# Patient Record
Sex: FEMALE | Race: WHITE | NOT HISPANIC OR LATINO | Employment: FULL TIME | URBAN - METROPOLITAN AREA
[De-identification: names, ages, dates, MRNs, and addresses within clinical notes are randomized per-mention and may not be internally consistent; named-entity substitution may affect disease eponyms.]

---

## 2017-03-10 LAB
ABO + RH BLD: NORMAL
ABO + RH BLD: NORMAL
BLD GP AB SCN SERPL QL: NORMAL
HBV SURFACE AG SERPL QL IA: NORMAL
HIV 1+2 AB+HIV1 P24 AG SERPL QL IA: NORMAL
RUBELLA ANTIBODY IGG QUANTITATIVE: NORMAL IU/ML
T PALLIDUM IGG SER QL: NORMAL

## 2017-09-14 LAB — GROUP B STREP PCR: NORMAL

## 2017-09-18 ENCOUNTER — HOSPITAL ENCOUNTER (INPATIENT)
Facility: CLINIC | Age: 32
LOS: 5 days | Discharge: HOME | End: 2017-09-23
Attending: OBSTETRICS & GYNECOLOGY | Admitting: OBSTETRICS & GYNECOLOGY
Payer: COMMERCIAL

## 2017-09-18 PROCEDURE — 12000029 ZZH R&B OB INTERMEDIATE

## 2017-09-18 PROCEDURE — 25000128 H RX IP 250 OP 636: Performed by: OBSTETRICS & GYNECOLOGY

## 2017-09-18 PROCEDURE — 25000132 ZZH RX MED GY IP 250 OP 250 PS 637: Performed by: OBSTETRICS & GYNECOLOGY

## 2017-09-18 PROCEDURE — 3E0P7GC INTRODUCTION OF OTHER THERAPEUTIC SUBSTANCE INTO FEMALE REPRODUCTIVE, VIA NATURAL OR ARTIFICIAL OPENING: ICD-10-PCS | Performed by: OBSTETRICS & GYNECOLOGY

## 2017-09-18 RX ORDER — HYDROXYZINE HYDROCHLORIDE 50 MG/1
50-100 TABLET, FILM COATED ORAL EVERY 6 HOURS PRN
Status: DISCONTINUED | OUTPATIENT
Start: 2017-09-18 | End: 2017-09-23 | Stop reason: HOSPADM

## 2017-09-18 RX ADMIN — SODIUM CHLORIDE, POTASSIUM CHLORIDE, SODIUM LACTATE AND CALCIUM CHLORIDE 1000 ML: 600; 310; 30; 20 INJECTION, SOLUTION INTRAVENOUS at 17:00

## 2017-09-18 RX ADMIN — HYDROXYZINE HYDROCHLORIDE 100 MG: 50 TABLET, FILM COATED ORAL at 21:02

## 2017-09-18 RX ADMIN — SODIUM CHLORIDE, POTASSIUM CHLORIDE, SODIUM LACTATE AND CALCIUM CHLORIDE: 600; 310; 30; 20 INJECTION, SOLUTION INTRAVENOUS at 21:30

## 2017-09-18 RX ADMIN — DINOPROSTONE 10 MG: 10 INSERT VAGINAL at 18:37

## 2017-09-18 NOTE — IP AVS SNAPSHOT
MRN:[id number]                      After Visit Summary   9/18/2017    Ms. Teri Mendez    MRN: [id number]           Thank you!     Thank you for choosing [facility] for your care. Our goal is always to provide you with excellent care. Hearing back from our patients is one way we can continue to improve our services. Please take a few minutes to complete the written survey that you may receive in the mail after you visit with us. Thank you!        Patient Information     Date Of Birth          1985        About your hospital stay     You were admitted on:  September 18, 2017 You last received care in the:  [address]    You were discharged on:  September 23, 2017        Reason for your hospital stay       Maternity care                  Who to Call     For medical emergencies, please call 911.  For non-urgent questions about your medical care, please call your primary care provider or clinic, [phone]  For questions related to your surgery, please call your surgery clinic        Attending Provider     Provider Specialty    Jazmin Hogue MD OB/Gyn       Primary Care Provider Office Phone # Fax #    Marilou Kimble [phone]      After Care Instructions     Activity       Review discharge instructions            Diet       Resume previous diet            Discharge Instructions - Gestational diabetic patients       Gestational diabetic patients to follow up for fasting blood sugar and 2 hour 75gm glucose load at 6 weeks postpartum.            Discharge Instructions - Hypertensive disorders patients       High Blood pressure patients to follow up in clinic or via home care within one week for a blood pressure check            Discharge Instructions - Postpartum visit       Schedule postpartum visit with your provider and return to clinic in 2 and 6 weeks.                  Further instructions from your care team       Postop   Birth Instructions    Activity       Do not lift more than 10 pounds for 6 weeks after surgery.  Ask family and friends for help when you need it.    No driving until you have stopped taking your pain medications (usually two weeks after surgery).    No heavy exercise or activity for 6 weeks.  Don't do anything that will put a strain on your surgery site.    Don't strain when using the toilet.  Your care team may prescribe a stool softener if you have problems with your bowel movements.     To care for your incision:       Keep the incision clean and dry.    Do not soak your incision in water. No swimming or hot tubs until it has fully healed. You may soak in the bathtub if the water level is below your incision.    Do not use peroxide, gel, cream, lotion, or ointment on your incision.    Adjust your clothes to avoid pressure on your surgery site (check the elastic in your underwear for example).     You may see a small amount of clear or pink drainage and this is normal.  Check with your health care provider:       If the drainage increases or has an odor.    If the incision reddens, you have swelling, or develop a rash.    If you have increased pain and the medicine we prescribed doesn't help.    If you have a fever above 100.4 F (38 C) with or without chills when placing thermometer under your tongue.   The area around your incision (surgery wound), will feel numb.  This is normal. The numbness should go away in less than a year.     Keep your hands clean:  Always wash your hands before touching your incision (surgery wound). This helps reduce your risk of infection. If your hands aren't dirty, you may use an alcohol hand-rub to clean your hands. Keep your nails clean and short.    Call your healthcare provider if you have any of these symptoms:       You soak a sanitary pad with blood within 1 hour, or you see blood clots larger than a golf ball.    Bleeding that lasts more than 6 weeks.    Vaginal  "discharge that smells bad.    Severe pain, cramping or tenderness in your lower belly area.    A need to urinate more frequently (use the toilet more often), more urgently (use the toilet very quickly), or it burns when you urinate.    Nausea and vomiting.    Redness, swelling or pain around a vein in your leg.    Problems breastfeeding or a red or painful area on your breast.    Chest pain and cough or are gasping for air.    Problems with coping with sadness, anxiety or depression. If you have concerns about hurting yourself or the baby, call your provider immediately.      You have questions or concerns after you return home.                  Pending Results     No orders found from 9/16/2017 to 9/19/2017.            Statement of Approval     Ordered          09/23/17 0926  I have reviewed and agree with all the recommendations and orders detailed in this document.  EFFECTIVE NOW     Approved and electronically signed by:  Paulo Nieto MD             Admission Information     Date & Time Provider Department Dept. Phone    9/18/2017 Jazmin Hogue MD [address] [phone]      Your Vitals Were     Blood Pressure Pulse Temperature Respirations Height Weight    120/92 92 97.9  F (36.6  C) (Oral) 16 1.676 m (5' 6\") 64.4 kg (142 lb)    Pulse Oximetry BMI (Body Mass Index)                98% 22.92 kg/m2          MyChart Information     [facility] lets you send messages to your doctor, view your test results, renew your prescriptions, schedule appointments and more. To sign up, go to [email or web address] . Click on \"Log in\" on the left side of the screen, which will take you to the Welcome page. Then click on \"Sign up Now\" on the right side of the page.     You will be asked to enter the access code listed below, as well as some personal information. Please follow the directions to create your username and password.     Your access code is: [email or web address]  Expires: " 2017  1:33 PM     Your access code will  in 90 days. If you need help or a new code, please call your [facility] clinic or [phone].        Care EveryWhere ID     This is your Care EveryWhere ID. This could be used by other organizations to access your [facility] medical records  [id number]        Equal Access to Services     BAUDILIOSAL ANDERSON : Hadii gwen ku hadasho Soomaali, waaxda luqadaha, qaybta kaalmada adecaroyada, alexy fariaslizbethjason harper . So [facility] [phone].    ATENCIÓN: Si habla español, tiene a west disposición servicios gratuitos de asistencia lingüística. Llame al [phone].    We comply with applicable federal civil rights laws and [place] laws. We do not discriminate on the basis of race, color, national origin, age, disability sex, sexual orientation or gender identity.               Review of your medicines      UNREVIEWED medicines. Ask your doctor about these medicines        Dose / Directions    acetaminophen 325 MG tablet   Commonly known as:  TYLENOL   Used for:  Missed         Dose:  650 mg   Take 2 tablets (650 mg) by mouth every 4 hours as needed for other (mild pain)   Quantity:  100 tablet   Refills:  0       breast pump Misc   Used for:  Postpartum care and examination of lactating mother   Ask about: Should I take this medication?        Dose:  1 each   1 each once for 1 dose   Quantity:  1 each   Refills:  0       doxycycline 100 MG tablet   Commonly known as:  VIBRA-TABS   Used for:  Missed         Dose:  100 mg   Take 1 tablet (100 mg) by mouth 2 times daily   Quantity:  10 tablet   Refills:  0       doxylamine 25 MG Tabs tablet   Commonly known as:  UNISOM        Dose:  25 mg   Take 25 mg by mouth nightly as needed   Refills:  0       ibuprofen 600 MG tablet   Commonly known as:  ADVIL/MOTRIN   Used for:  Missed         Dose:  600 mg   Take 1 tablet (600 mg) by mouth every 6 hours as needed for pain (mild)   Quantity:  30 tablet    Refills:  0       PNV FE FUM/DOCUSATE/FOLIC ACID 29-1 MG Tabs        Refills:  0         START taking        Dose / Directions    oxyCODONE 5 MG IR tablet   Commonly known as:  ROXICODONE   Used for:  Postpartum care and examination of lactating mother        Dose:  5-10 mg   Take 1-2 tablets (5-10 mg) by mouth every 3 hours as needed for moderate to severe pain   Quantity:  30 tablet   Refills:  0         STOP taking     acetaminophen-codeine 300-30 MG per tablet   Commonly known as:  TYLENOL w/CODEINE No. 3                Where to get your medicines      Some of these will need a paper prescription and others can be bought over the counter. Ask your nurse if you have questions.     Bring a paper prescription for each of these medications     breast pump Misc    oxyCODONE 5 MG IR tablet                Protect others around you: Learn how to safely use, store and throw away your medicines at www.disposemymeds.org.             Medication List: This is a list of all your medications and when to take them. Check marks below indicate your daily home schedule. Keep this list as a reference.      Medications           Morning Afternoon Evening Bedtime As Needed    acetaminophen 325 MG tablet   Commonly known as:  TYLENOL   Take 2 tablets (650 mg) by mouth every 4 hours as needed for other (mild pain)   Last time this was given:  975 mg on 9/23/2017 10:50 AM                                doxycycline 100 MG tablet   Commonly known as:  VIBRA-TABS   Take 1 tablet (100 mg) by mouth 2 times daily                                doxylamine 25 MG Tabs tablet   Commonly known as:  UNISOM   Take 25 mg by mouth nightly as needed                                ibuprofen 600 MG tablet   Commonly known as:  ADVIL/MOTRIN   Take 1 tablet (600 mg) by mouth every 6 hours as needed for pain (mild)   Last time this was given:  800 mg on 9/23/2017 12:46 PM                                oxyCODONE 5 MG IR tablet   Commonly known as:   ROXICODONE   Take 1-2 tablets (5-10 mg) by mouth every 3 hours as needed for moderate to severe pain   Last time this was given:  10 mg on 9/23/2017 12:46 PM                                PNV FE FUM/DOCUSATE/FOLIC ACID 29-1 MG Tabs                                  ASK your doctor about these medications           Morning Afternoon Evening Bedtime As Needed    breast pump Misc   1 each once for 1 dose   Ask about: Should I take this medication?

## 2017-09-18 NOTE — H&P
"Labor and delivery admit note.  HPI  Teri Mendez  is a 31 year old   at 36w3d admitted for induction of labor secondary to diagnosis made today of cholestasis of pregnancy with bile acids of 103 with elevated AST/ALT.  She presented to the office on Friday with itching at night that was mild but bothersome.  Labs were obtained and results came in today.  MFM was consulted, and they agree that proceeding with delivery.      Estimated Date of Delivery: Oct 13, 2017  36w3d  Prenatal care - early and adequate with Dr. Hogue, dated by LMP c/w first trimester USG    Prenatal course - complicated by h/o cervical cancer s/p LEEP procedure, h/o miscarriage, cholestasis in pregnancy     Prenatal labs  Blood type O, Rh positive  HIV-negative  Hepatitis BsAg- negative  Trep AB- Negative  Rubella- Immune  Pap- normal  GC/Chlamydia- negative  Quad screen- normal  Glucola- normal  GBS- negative.    Past Medical History:   Diagnosis Date     Cervical cancer (H)      PONV (postoperative nausea and vomiting)      Past Surgical History:   Procedure Laterality Date     DILATION AND CURETTAGE SUCTION N/A 2016    Procedure: DILATION AND CURETTAGE SUCTION;  Surgeon: Marilou Kimble MD;  Location: [facility]     ORTHOPEDIC SURGERY       Social History   Substance Use Topics     Smoking status: Never Smoker     Smokeless tobacco: Not on file     Alcohol use Yes      Comment: 5/week       Objective  Alert and oriented  /68  Temp 97.9  F (36.6  C) (Temporal)  Resp 16  Ht 1.676 m (5' 6\")  Wt 64.4 kg (142 lb)  BMI 22.92 kg/m2  Heart and Lungs- normal  PA- uterus full term. Contractions + q 2-3 mins  FHR- 130 baseline, good variability, accels +. Mild variables seen.  Pelvic ( by admitting RN )  Cx: closed/70/-2    Assessment   at 36w3d - cholestasis in pregnancy with elevated liver function studies.     Admit to L&D  Routine intrapartum management.  Cervical ripening and induction with oxytocin/AROM " when able  Epidural prn  Anticipate   GBS negative    Jazmin Hogue MD

## 2017-09-18 NOTE — IP AVS SNAPSHOT
[address]    [place] [address]    Phone:  [phone]                                       After Visit Summary   9/18/2017    Ms. Teri Mendez    MRN: [id number]           After Visit Summary Signature Page     I have received my discharge instructions, and my questions have been answered. I have discussed any challenges I see with this plan with the nurse or doctor.    ..........................................................................................................................................  Patient/Patient Representative Signature      ..........................................................................................................................................  Patient Representative Print Name and Relationship to Patient    ..................................................               ................................................  Date                                            Time    ..........................................................................................................................................  Reviewed by Signature/Title    ...................................................              ..............................................  Date                                                            Time

## 2017-09-19 PROCEDURE — 25000128 H RX IP 250 OP 636: Performed by: OBSTETRICS & GYNECOLOGY

## 2017-09-19 PROCEDURE — 25000125 ZZHC RX 250: Performed by: OBSTETRICS & GYNECOLOGY

## 2017-09-19 PROCEDURE — S0191 MISOPROSTOL, ORAL, 200 MCG: HCPCS | Performed by: OBSTETRICS & GYNECOLOGY

## 2017-09-19 PROCEDURE — 25000132 ZZH RX MED GY IP 250 OP 250 PS 637: Performed by: OBSTETRICS & GYNECOLOGY

## 2017-09-19 PROCEDURE — 12000029 ZZH R&B OB INTERMEDIATE

## 2017-09-19 RX ORDER — BETAMETHASONE SODIUM PHOSPHATE AND BETAMETHASONE ACETATE 3; 3 MG/ML; MG/ML
12 INJECTION, SUSPENSION INTRA-ARTICULAR; INTRALESIONAL; INTRAMUSCULAR; SOFT TISSUE DAILY
Status: DISCONTINUED | OUTPATIENT
Start: 2017-09-19 | End: 2017-09-20

## 2017-09-19 RX ORDER — SODIUM CHLORIDE, SODIUM LACTATE, POTASSIUM CHLORIDE, CALCIUM CHLORIDE 600; 310; 30; 20 MG/100ML; MG/100ML; MG/100ML; MG/100ML
INJECTION, SOLUTION INTRAVENOUS CONTINUOUS
Status: DISCONTINUED | OUTPATIENT
Start: 2017-09-18 | End: 2017-09-20

## 2017-09-19 RX ORDER — LIDOCAINE 40 MG/G
CREAM TOPICAL
Status: DISCONTINUED | OUTPATIENT
Start: 2017-09-19 | End: 2017-09-20

## 2017-09-19 RX ORDER — PRENATAL VIT/IRON FUM/FOLIC AC 27MG-0.8MG
1 TABLET ORAL DAILY
Status: DISCONTINUED | OUTPATIENT
Start: 2017-09-19 | End: 2017-09-20

## 2017-09-19 RX ORDER — MISOPROSTOL 100 UG/1
25 TABLET ORAL EVERY 4 HOURS PRN
Status: DISCONTINUED | OUTPATIENT
Start: 2017-09-19 | End: 2017-09-20

## 2017-09-19 RX ORDER — OXYTOCIN/0.9 % SODIUM CHLORIDE 30/500 ML
1-24 PLASTIC BAG, INJECTION (ML) INTRAVENOUS CONTINUOUS
Status: DISCONTINUED | OUTPATIENT
Start: 2017-09-19 | End: 2017-09-20

## 2017-09-19 RX ADMIN — BETAMETHASONE SODIUM PHOSPHATE AND BETAMETHASONE ACETATE 12 MG: 3; 3 INJECTION, SUSPENSION INTRA-ARTICULAR; INTRALESIONAL; INTRAMUSCULAR at 20:53

## 2017-09-19 RX ADMIN — HYDROXYZINE HYDROCHLORIDE 100 MG: 50 TABLET, FILM COATED ORAL at 03:34

## 2017-09-19 RX ADMIN — Medication 25 MCG: at 09:12

## 2017-09-19 RX ADMIN — SODIUM CHLORIDE, POTASSIUM CHLORIDE, SODIUM LACTATE AND CALCIUM CHLORIDE: 600; 310; 30; 20 INJECTION, SOLUTION INTRAVENOUS at 16:04

## 2017-09-19 RX ADMIN — SODIUM CHLORIDE, POTASSIUM CHLORIDE, SODIUM LACTATE AND CALCIUM CHLORIDE: 600; 310; 30; 20 INJECTION, SOLUTION INTRAVENOUS at 05:30

## 2017-09-19 RX ADMIN — HYDROXYZINE HYDROCHLORIDE 100 MG: 50 TABLET, FILM COATED ORAL at 21:08

## 2017-09-19 RX ADMIN — SODIUM CHLORIDE, POTASSIUM CHLORIDE, SODIUM LACTATE AND CALCIUM CHLORIDE: 600; 310; 30; 20 INJECTION, SOLUTION INTRAVENOUS at 13:29

## 2017-09-19 RX ADMIN — OXYTOCIN-SODIUM CHLORIDE 0.9% IV SOLN 30 UNIT/500ML 1 MILLI-UNITS/MIN: 30-0.9/5 SOLUTION at 16:09

## 2017-09-19 NOTE — PLAN OF CARE
36+3 week primip admitted direct from clinic for cervical ripening for cholestasis. EUM/US applied with pts consent. Will await orders from Dr Hogue for induction.

## 2017-09-19 NOTE — PLAN OF CARE
Assumed care of patient at 0730. Pt comfortable, c/o mild cramping, with uterine contractions. Cervidil was removed at 0630. Pt has breakfast and plans to shower. PIV saline locked for shower. SVE at 0830, closed, posterior. Inner os closed, outer os fingertip. Dr. Hogue notified. Orders received for vaginal cytotec. With pt verbal consent, Cytotec administered at 0915. Pt tolerated well.  Pt and spouse verbalize understanding of plan of care. No questions or concerns at this time. Dr Hogue in department and updated at 1030. Cont to monitor and assess.  FHTs moderate variability, +accels, occasional variables noted.

## 2017-09-19 NOTE — PLAN OF CARE
SVE at 1315 remains unchanged, closed/70/-3, posterior. Uterine contractions tachysystole. Dr. Hogue notified of uterine activity and unable to administer next dose of cytotec or initiate pitocin. Fluid flush administered and uterine activity reassessed at 1415. Tachysytole continues. 1L fluid flush administered and repositioned patient onto left side. Dr. Hogue updated. At 1610, pitocin started with pt verbal consent. Pt and spouse verbalize understanding of plan of care. FHTs moderate variability, +accels, rare decels noted. Cont to monitor and assess.

## 2017-09-19 NOTE — PROGRESS NOTES
Teri Mendez  9/19/2017  [facility]    Patient is getting cytotec this morning, as cervidil overnight did not cause much cervical change - still closed/thick/high/posterior.  Discussed induction can take a long while, but given her diagnosis of cholestasis - important to move forwards with delivery.  Patient and  agreeable to plan.  Reactive NST.    Jazmin Hogue MD

## 2017-09-19 NOTE — PROGRESS NOTES
Teri Mendez  9/19/2017  [facility]    Patient currently has cervidil in place.  She received 100 mg Vistaril at 9 pm to help her sleep, given extensive itching due to cholestasis.  She will receive another dose at 3 am.  Reactive/reassuring NST.  Irregular contractions on tocometer.  Will consider cytotec vs. pitocin once cervidil is due to come out at around 7 am.      Jazmin Hogue MD

## 2017-09-20 ENCOUNTER — ANESTHESIA EVENT (OUTPATIENT)
Dept: OBGYN | Facility: CLINIC | Age: 32
End: 2017-09-20
Payer: COMMERCIAL

## 2017-09-20 ENCOUNTER — ANESTHESIA (OUTPATIENT)
Dept: OBGYN | Facility: CLINIC | Age: 32
End: 2017-09-20
Payer: COMMERCIAL

## 2017-09-20 PROBLEM — Z98.891 S/P PRIMARY LOW TRANSVERSE C-SECTION: Status: ACTIVE | Noted: 2017-09-20

## 2017-09-20 LAB
ABO + RH BLD: NORMAL
ABO + RH BLD: NORMAL
ALBUMIN SERPL-MCNC: 2 G/DL (ref 3.4–5)
ALP SERPL-CCNC: 261 U/L (ref 40–150)
ALT SERPL W P-5'-P-CCNC: 163 U/L (ref 0–50)
APTT PPP: 25 SEC (ref 22–37)
AST SERPL W P-5'-P-CCNC: 91 U/L (ref 0–45)
BILIRUB DIRECT SERPL-MCNC: 0.1 MG/DL (ref 0–0.2)
BILIRUB SERPL-MCNC: 0.3 MG/DL (ref 0.2–1.3)
BLD GP AB SCN SERPL QL: NORMAL
BLOOD BANK CMNT PATIENT-IMP: NORMAL
ERYTHROCYTE [DISTWIDTH] IN BLOOD BY AUTOMATED COUNT: 12.2 % (ref 10–15)
HCT VFR BLD AUTO: 33.4 % (ref 35–47)
HGB BLD-MCNC: 11.4 G/DL (ref 11.7–15.7)
INR PPP: 0.87 (ref 0.86–1.14)
MCH RBC QN AUTO: 30.8 PG (ref 26.5–33)
MCHC RBC AUTO-ENTMCNC: 34.1 G/DL (ref 31.5–36.5)
MCV RBC AUTO: 90 FL (ref 78–100)
PLATELET # BLD AUTO: 222 10E9/L (ref 150–450)
PROT SERPL-MCNC: 6.1 G/DL (ref 6.8–8.8)
RBC # BLD AUTO: 3.7 10E12/L (ref 3.8–5.2)
SPECIMEN EXP DATE BLD: NORMAL
T PALLIDUM IGG+IGM SER QL: NEGATIVE
WBC # BLD AUTO: 9.2 10E9/L (ref 4–11)

## 2017-09-20 PROCEDURE — 25000128 H RX IP 250 OP 636: Performed by: NURSE ANESTHETIST, CERTIFIED REGISTERED

## 2017-09-20 PROCEDURE — 85730 THROMBOPLASTIN TIME PARTIAL: CPT | Performed by: ANESTHESIOLOGY

## 2017-09-20 PROCEDURE — 25000128 H RX IP 250 OP 636: Performed by: OBSTETRICS & GYNECOLOGY

## 2017-09-20 PROCEDURE — 71000012 ZZH RECOVERY PHASE 1 LEVEL 1 FIRST HR: Performed by: OBSTETRICS & GYNECOLOGY

## 2017-09-20 PROCEDURE — 88307 TISSUE EXAM BY PATHOLOGIST: CPT | Mod: 26 | Performed by: OBSTETRICS & GYNECOLOGY

## 2017-09-20 PROCEDURE — 80076 HEPATIC FUNCTION PANEL: CPT | Performed by: ANESTHESIOLOGY

## 2017-09-20 PROCEDURE — 86780 TREPONEMA PALLIDUM: CPT | Performed by: OBSTETRICS & GYNECOLOGY

## 2017-09-20 PROCEDURE — 27210794 ZZH OR GENERAL SUPPLY STERILE: Performed by: OBSTETRICS & GYNECOLOGY

## 2017-09-20 PROCEDURE — 25000125 ZZHC RX 250: Performed by: OBSTETRICS & GYNECOLOGY

## 2017-09-20 PROCEDURE — 25000128 H RX IP 250 OP 636: Performed by: ANESTHESIOLOGY

## 2017-09-20 PROCEDURE — 25000132 ZZH RX MED GY IP 250 OP 250 PS 637: Performed by: OBSTETRICS & GYNECOLOGY

## 2017-09-20 PROCEDURE — 36415 COLL VENOUS BLD VENIPUNCTURE: CPT | Performed by: OBSTETRICS & GYNECOLOGY

## 2017-09-20 PROCEDURE — 37000008 ZZH ANESTHESIA TECHNICAL FEE, 1ST 30 MIN: Performed by: OBSTETRICS & GYNECOLOGY

## 2017-09-20 PROCEDURE — 37000009 ZZH ANESTHESIA TECHNICAL FEE, EACH ADDTL 15 MIN: Performed by: OBSTETRICS & GYNECOLOGY

## 2017-09-20 PROCEDURE — 25000125 ZZHC RX 250: Performed by: ANESTHESIOLOGY

## 2017-09-20 PROCEDURE — S0191 MISOPROSTOL, ORAL, 200 MCG: HCPCS | Performed by: OBSTETRICS & GYNECOLOGY

## 2017-09-20 PROCEDURE — 12000037 ZZH R&B POSTPARTUM INTERMEDIATE

## 2017-09-20 PROCEDURE — 86901 BLOOD TYPING SEROLOGIC RH(D): CPT | Performed by: OBSTETRICS & GYNECOLOGY

## 2017-09-20 PROCEDURE — 36000058 ZZH SURGERY LEVEL 3 EA 15 ADDTL MIN: Performed by: OBSTETRICS & GYNECOLOGY

## 2017-09-20 PROCEDURE — 36000056 ZZH SURGERY LEVEL 3 1ST 30 MIN: Performed by: OBSTETRICS & GYNECOLOGY

## 2017-09-20 PROCEDURE — 85610 PROTHROMBIN TIME: CPT | Performed by: ANESTHESIOLOGY

## 2017-09-20 PROCEDURE — 85027 COMPLETE CBC AUTOMATED: CPT | Performed by: OBSTETRICS & GYNECOLOGY

## 2017-09-20 PROCEDURE — 86900 BLOOD TYPING SEROLOGIC ABO: CPT | Performed by: OBSTETRICS & GYNECOLOGY

## 2017-09-20 PROCEDURE — 86850 RBC ANTIBODY SCREEN: CPT | Performed by: OBSTETRICS & GYNECOLOGY

## 2017-09-20 PROCEDURE — 25000125 ZZHC RX 250: Performed by: NURSE ANESTHETIST, CERTIFIED REGISTERED

## 2017-09-20 PROCEDURE — 71000013 ZZH RECOVERY PHASE 1 LEVEL 1 EA ADDTL HR: Performed by: OBSTETRICS & GYNECOLOGY

## 2017-09-20 PROCEDURE — 88307 TISSUE EXAM BY PATHOLOGIST: CPT | Performed by: OBSTETRICS & GYNECOLOGY

## 2017-09-20 PROCEDURE — 36415 COLL VENOUS BLD VENIPUNCTURE: CPT | Performed by: ANESTHESIOLOGY

## 2017-09-20 RX ORDER — FENTANYL CITRATE 50 UG/ML
INJECTION, SOLUTION INTRAMUSCULAR; INTRAVENOUS
Status: COMPLETED
Start: 2017-09-20 | End: 2017-09-20

## 2017-09-20 RX ORDER — MISOPROSTOL 200 UG/1
800 TABLET ORAL
Status: DISCONTINUED | OUTPATIENT
Start: 2017-09-20 | End: 2017-09-23 | Stop reason: HOSPADM

## 2017-09-20 RX ORDER — SIMETHICONE 80 MG
80 TABLET,CHEWABLE ORAL 4 TIMES DAILY PRN
Status: DISCONTINUED | OUTPATIENT
Start: 2017-09-20 | End: 2017-09-23 | Stop reason: HOSPADM

## 2017-09-20 RX ORDER — BUPIVACAINE HYDROCHLORIDE 7.5 MG/ML
INJECTION, SOLUTION INTRASPINAL PRN
Status: DISCONTINUED | OUTPATIENT
Start: 2017-09-20 | End: 2017-09-20

## 2017-09-20 RX ORDER — ONDANSETRON 2 MG/ML
INJECTION INTRAMUSCULAR; INTRAVENOUS
Status: DISCONTINUED
Start: 2017-09-20 | End: 2017-09-20 | Stop reason: HOSPADM

## 2017-09-20 RX ORDER — OXYTOCIN/0.9 % SODIUM CHLORIDE 30/500 ML
340 PLASTIC BAG, INJECTION (ML) INTRAVENOUS CONTINUOUS PRN
Status: DISCONTINUED | OUTPATIENT
Start: 2017-09-20 | End: 2017-09-23 | Stop reason: HOSPADM

## 2017-09-20 RX ORDER — NALOXONE HYDROCHLORIDE 0.4 MG/ML
.1-.4 INJECTION, SOLUTION INTRAMUSCULAR; INTRAVENOUS; SUBCUTANEOUS
Status: DISCONTINUED | OUTPATIENT
Start: 2017-09-20 | End: 2017-09-23 | Stop reason: HOSPADM

## 2017-09-20 RX ORDER — HYDROMORPHONE HYDROCHLORIDE 1 MG/ML
.3-.5 INJECTION, SOLUTION INTRAMUSCULAR; INTRAVENOUS; SUBCUTANEOUS EVERY 30 MIN PRN
Status: DISCONTINUED | OUTPATIENT
Start: 2017-09-20 | End: 2017-09-23 | Stop reason: HOSPADM

## 2017-09-20 RX ORDER — LANOLIN 100 %
OINTMENT (GRAM) TOPICAL
Status: DISCONTINUED | OUTPATIENT
Start: 2017-09-20 | End: 2017-09-23 | Stop reason: HOSPADM

## 2017-09-20 RX ORDER — HYDROCORTISONE 2.5 %
CREAM (GRAM) TOPICAL 3 TIMES DAILY PRN
Status: DISCONTINUED | OUTPATIENT
Start: 2017-09-20 | End: 2017-09-23 | Stop reason: HOSPADM

## 2017-09-20 RX ORDER — EPHEDRINE SULFATE 50 MG/ML
5 INJECTION, SOLUTION INTRAMUSCULAR; INTRAVENOUS; SUBCUTANEOUS
Status: DISCONTINUED | OUTPATIENT
Start: 2017-09-20 | End: 2017-09-23 | Stop reason: HOSPADM

## 2017-09-20 RX ORDER — ACETAMINOPHEN 325 MG/1
650 TABLET ORAL EVERY 4 HOURS PRN
Status: DISCONTINUED | OUTPATIENT
Start: 2017-09-23 | End: 2017-09-23 | Stop reason: HOSPADM

## 2017-09-20 RX ORDER — ACETAMINOPHEN 325 MG/1
975 TABLET ORAL EVERY 8 HOURS
Status: COMPLETED | OUTPATIENT
Start: 2017-09-20 | End: 2017-09-23

## 2017-09-20 RX ORDER — CEFAZOLIN SODIUM 1 G/3ML
1 INJECTION, POWDER, FOR SOLUTION INTRAMUSCULAR; INTRAVENOUS SEE ADMIN INSTRUCTIONS
Status: DISCONTINUED | OUTPATIENT
Start: 2017-09-20 | End: 2017-09-20

## 2017-09-20 RX ORDER — LIDOCAINE 40 MG/G
CREAM TOPICAL
Status: DISCONTINUED | OUTPATIENT
Start: 2017-09-20 | End: 2017-09-23 | Stop reason: HOSPADM

## 2017-09-20 RX ORDER — SODIUM CHLORIDE, SODIUM LACTATE, POTASSIUM CHLORIDE, CALCIUM CHLORIDE 600; 310; 30; 20 MG/100ML; MG/100ML; MG/100ML; MG/100ML
INJECTION, SOLUTION INTRAVENOUS CONTINUOUS
Status: DISCONTINUED | OUTPATIENT
Start: 2017-09-20 | End: 2017-09-20

## 2017-09-20 RX ORDER — DIPHENHYDRAMINE HCL 25 MG
25 CAPSULE ORAL EVERY 6 HOURS PRN
Status: DISCONTINUED | OUTPATIENT
Start: 2017-09-20 | End: 2017-09-23 | Stop reason: HOSPADM

## 2017-09-20 RX ORDER — OXYTOCIN/0.9 % SODIUM CHLORIDE 30/500 ML
100 PLASTIC BAG, INJECTION (ML) INTRAVENOUS CONTINUOUS
Status: DISCONTINUED | OUTPATIENT
Start: 2017-09-20 | End: 2017-09-23 | Stop reason: HOSPADM

## 2017-09-20 RX ORDER — FENTANYL CITRATE 50 UG/ML
INJECTION, SOLUTION INTRAMUSCULAR; INTRAVENOUS PRN
Status: DISCONTINUED | OUTPATIENT
Start: 2017-09-20 | End: 2017-09-20

## 2017-09-20 RX ORDER — OXYTOCIN/0.9 % SODIUM CHLORIDE 30/500 ML
PLASTIC BAG, INJECTION (ML) INTRAVENOUS CONTINUOUS PRN
Status: DISCONTINUED | OUTPATIENT
Start: 2017-09-20 | End: 2017-09-20

## 2017-09-20 RX ORDER — ACETAMINOPHEN 325 MG/1
TABLET ORAL
Status: DISCONTINUED
Start: 2017-09-20 | End: 2017-09-20 | Stop reason: HOSPADM

## 2017-09-20 RX ORDER — BISACODYL 10 MG
10 SUPPOSITORY, RECTAL RECTAL DAILY PRN
Status: DISCONTINUED | OUTPATIENT
Start: 2017-09-22 | End: 2017-09-23 | Stop reason: HOSPADM

## 2017-09-20 RX ORDER — OXYTOCIN 10 [USP'U]/ML
10 INJECTION, SOLUTION INTRAMUSCULAR; INTRAVENOUS
Status: DISCONTINUED | OUTPATIENT
Start: 2017-09-20 | End: 2017-09-23 | Stop reason: HOSPADM

## 2017-09-20 RX ORDER — KETOROLAC TROMETHAMINE 30 MG/ML
INJECTION, SOLUTION INTRAMUSCULAR; INTRAVENOUS
Status: DISCONTINUED
Start: 2017-09-20 | End: 2017-09-20 | Stop reason: HOSPADM

## 2017-09-20 RX ORDER — NALBUPHINE HYDROCHLORIDE 10 MG/ML
2.5-5 INJECTION, SOLUTION INTRAMUSCULAR; INTRAVENOUS; SUBCUTANEOUS EVERY 6 HOURS PRN
Status: DISCONTINUED | OUTPATIENT
Start: 2017-09-20 | End: 2017-09-23 | Stop reason: HOSPADM

## 2017-09-20 RX ORDER — DEXTROSE, SODIUM CHLORIDE, SODIUM LACTATE, POTASSIUM CHLORIDE, AND CALCIUM CHLORIDE 5; .6; .31; .03; .02 G/100ML; G/100ML; G/100ML; G/100ML; G/100ML
INJECTION, SOLUTION INTRAVENOUS CONTINUOUS
Status: DISCONTINUED | OUTPATIENT
Start: 2017-09-20 | End: 2017-09-23 | Stop reason: HOSPADM

## 2017-09-20 RX ORDER — CITRIC ACID/SODIUM CITRATE 334-500MG
30 SOLUTION, ORAL ORAL
Status: COMPLETED | OUTPATIENT
Start: 2017-09-20 | End: 2017-09-20

## 2017-09-20 RX ORDER — PROPOFOL 10 MG/ML
INJECTION, EMULSION INTRAVENOUS PRN
Status: DISCONTINUED | OUTPATIENT
Start: 2017-09-20 | End: 2017-09-20

## 2017-09-20 RX ORDER — EPHEDRINE SULFATE 50 MG/ML
INJECTION, SOLUTION INTRAMUSCULAR; INTRAVENOUS; SUBCUTANEOUS PRN
Status: DISCONTINUED | OUTPATIENT
Start: 2017-09-20 | End: 2017-09-20

## 2017-09-20 RX ORDER — IBUPROFEN 400 MG/1
400-800 TABLET, FILM COATED ORAL EVERY 6 HOURS PRN
Status: DISCONTINUED | OUTPATIENT
Start: 2017-09-21 | End: 2017-09-21

## 2017-09-20 RX ORDER — KETOROLAC TROMETHAMINE 30 MG/ML
30 INJECTION, SOLUTION INTRAMUSCULAR; INTRAVENOUS EVERY 6 HOURS
Status: DISPENSED | OUTPATIENT
Start: 2017-09-20 | End: 2017-09-21

## 2017-09-20 RX ORDER — OXYCODONE HYDROCHLORIDE 5 MG/1
5-10 TABLET ORAL
Status: DISCONTINUED | OUTPATIENT
Start: 2017-09-20 | End: 2017-09-23 | Stop reason: HOSPADM

## 2017-09-20 RX ORDER — METHYLERGONOVINE MALEATE 0.2 MG/ML
200 INJECTION INTRAVENOUS
Status: DISCONTINUED | OUTPATIENT
Start: 2017-09-20 | End: 2017-09-23 | Stop reason: HOSPADM

## 2017-09-20 RX ORDER — CARBOPROST TROMETHAMINE 250 UG/ML
250 INJECTION, SOLUTION INTRAMUSCULAR
Status: DISCONTINUED | OUTPATIENT
Start: 2017-09-20 | End: 2017-09-23 | Stop reason: HOSPADM

## 2017-09-20 RX ORDER — MORPHINE SULFATE 1 MG/ML
INJECTION, SOLUTION EPIDURAL; INTRATHECAL; INTRAVENOUS PRN
Status: DISCONTINUED | OUTPATIENT
Start: 2017-09-20 | End: 2017-09-20

## 2017-09-20 RX ORDER — DIPHENHYDRAMINE HYDROCHLORIDE 50 MG/ML
25 INJECTION INTRAMUSCULAR; INTRAVENOUS EVERY 6 HOURS PRN
Status: DISCONTINUED | OUTPATIENT
Start: 2017-09-20 | End: 2017-09-23 | Stop reason: HOSPADM

## 2017-09-20 RX ORDER — ONDANSETRON 2 MG/ML
INJECTION INTRAMUSCULAR; INTRAVENOUS PRN
Status: DISCONTINUED | OUTPATIENT
Start: 2017-09-20 | End: 2017-09-20

## 2017-09-20 RX ORDER — MORPHINE SULFATE 1 MG/ML
INJECTION, SOLUTION EPIDURAL; INTRATHECAL; INTRAVENOUS
Status: COMPLETED
Start: 2017-09-20 | End: 2017-09-20

## 2017-09-20 RX ORDER — CEFAZOLIN SODIUM 2 G/100ML
2 INJECTION, SOLUTION INTRAVENOUS
Status: COMPLETED | OUTPATIENT
Start: 2017-09-20 | End: 2017-09-20

## 2017-09-20 RX ORDER — ONDANSETRON 2 MG/ML
4 INJECTION INTRAMUSCULAR; INTRAVENOUS EVERY 6 HOURS PRN
Status: DISCONTINUED | OUTPATIENT
Start: 2017-09-20 | End: 2017-09-23 | Stop reason: HOSPADM

## 2017-09-20 RX ORDER — AMOXICILLIN 250 MG
1-2 CAPSULE ORAL 2 TIMES DAILY
Status: DISCONTINUED | OUTPATIENT
Start: 2017-09-20 | End: 2017-09-23 | Stop reason: HOSPADM

## 2017-09-20 RX ADMIN — FENTANYL CITRATE 50 MCG: 50 INJECTION, SOLUTION INTRAMUSCULAR; INTRAVENOUS at 18:43

## 2017-09-20 RX ADMIN — ACETAMINOPHEN 975 MG: 325 TABLET, FILM COATED ORAL at 19:18

## 2017-09-20 RX ADMIN — Medication 5 MG: at 18:19

## 2017-09-20 RX ADMIN — PHENYLEPHRINE HYDROCHLORIDE 100 MCG: 10 INJECTION, SOLUTION INTRAMUSCULAR; INTRAVENOUS; SUBCUTANEOUS at 18:19

## 2017-09-20 RX ADMIN — PRENATAL VIT W/ FE FUMARATE-FA TAB 27-0.8 MG 1 TABLET: 27-0.8 TAB at 08:56

## 2017-09-20 RX ADMIN — FENTANYL CITRATE 50 MCG: 50 INJECTION, SOLUTION INTRAMUSCULAR; INTRAVENOUS at 18:18

## 2017-09-20 RX ADMIN — NALBUPHINE HYDROCHLORIDE 2.5 MG: 10 INJECTION, SOLUTION INTRAMUSCULAR; INTRAVENOUS; SUBCUTANEOUS at 21:40

## 2017-09-20 RX ADMIN — SODIUM CITRATE AND CITRIC ACID MONOHYDRATE 30 ML: 500; 334 SOLUTION ORAL at 16:42

## 2017-09-20 RX ADMIN — HYDROXYZINE HYDROCHLORIDE 100 MG: 50 TABLET, FILM COATED ORAL at 13:41

## 2017-09-20 RX ADMIN — PHENYLEPHRINE HYDROCHLORIDE 100 MCG: 10 INJECTION, SOLUTION INTRAMUSCULAR; INTRAVENOUS; SUBCUTANEOUS at 18:38

## 2017-09-20 RX ADMIN — CEFAZOLIN SODIUM 2 G: 2 INJECTION, SOLUTION INTRAVENOUS at 17:54

## 2017-09-20 RX ADMIN — ONDANSETRON 4 MG: 2 INJECTION INTRAMUSCULAR; INTRAVENOUS at 17:47

## 2017-09-20 RX ADMIN — KETOROLAC TROMETHAMINE 30 MG: 30 INJECTION, SOLUTION INTRAMUSCULAR at 19:10

## 2017-09-20 RX ADMIN — Medication 340 ML/HR: at 18:11

## 2017-09-20 RX ADMIN — Medication 25 MCG: at 08:49

## 2017-09-20 RX ADMIN — BUPIVACAINE HYDROCHLORIDE IN DEXTROSE 10.5 MG: 7.5 INJECTION, SOLUTION SUBARACHNOID at 17:48

## 2017-09-20 RX ADMIN — PHENYLEPHRINE HYDROCHLORIDE 50 MCG: 10 INJECTION, SOLUTION INTRAMUSCULAR; INTRAVENOUS; SUBCUTANEOUS at 18:01

## 2017-09-20 RX ADMIN — PROPOFOL 20 MG: 10 INJECTION, EMULSION INTRAVENOUS at 18:30

## 2017-09-20 RX ADMIN — SODIUM CHLORIDE, POTASSIUM CHLORIDE, SODIUM LACTATE AND CALCIUM CHLORIDE: 600; 310; 30; 20 INJECTION, SOLUTION INTRAVENOUS at 13:42

## 2017-09-20 RX ADMIN — PROPOFOL 20 MG: 10 INJECTION, EMULSION INTRAVENOUS at 18:20

## 2017-09-20 RX ADMIN — PHENYLEPHRINE HYDROCHLORIDE 50 MCG: 10 INJECTION, SOLUTION INTRAMUSCULAR; INTRAVENOUS; SUBCUTANEOUS at 17:54

## 2017-09-20 RX ADMIN — SODIUM CHLORIDE, POTASSIUM CHLORIDE, SODIUM LACTATE AND CALCIUM CHLORIDE: 600; 310; 30; 20 INJECTION, SOLUTION INTRAVENOUS at 17:53

## 2017-09-20 RX ADMIN — HYDROXYZINE HYDROCHLORIDE 100 MG: 50 TABLET, FILM COATED ORAL at 03:00

## 2017-09-20 RX ADMIN — SODIUM CHLORIDE, POTASSIUM CHLORIDE, SODIUM LACTATE AND CALCIUM CHLORIDE: 600; 310; 30; 20 INJECTION, SOLUTION INTRAVENOUS at 18:45

## 2017-09-20 RX ADMIN — SODIUM CHLORIDE, POTASSIUM CHLORIDE, SODIUM LACTATE AND CALCIUM CHLORIDE 1000 ML: 600; 310; 30; 20 INJECTION, SOLUTION INTRAVENOUS at 16:33

## 2017-09-20 RX ADMIN — SODIUM CHLORIDE, POTASSIUM CHLORIDE, SODIUM LACTATE AND CALCIUM CHLORIDE: 600; 310; 30; 20 INJECTION, SOLUTION INTRAVENOUS at 07:35

## 2017-09-20 RX ADMIN — PHENYLEPHRINE HYDROCHLORIDE 100 MCG: 10 INJECTION, SOLUTION INTRAMUSCULAR; INTRAVENOUS; SUBCUTANEOUS at 18:23

## 2017-09-20 RX ADMIN — OXYTOCIN-SODIUM CHLORIDE 0.9% IV SOLN 30 UNIT/500ML 100 ML/HR: 30-0.9/5 SOLUTION at 22:37

## 2017-09-20 RX ADMIN — MORPHINE SULFATE 0.2 MG: 1 INJECTION EPIDURAL; INTRATHECAL; INTRAVENOUS at 17:49

## 2017-09-20 RX ADMIN — PHENYLEPHRINE HYDROCHLORIDE 100 MCG: 10 INJECTION, SOLUTION INTRAMUSCULAR; INTRAVENOUS; SUBCUTANEOUS at 18:13

## 2017-09-20 NOTE — OP NOTE
Section Operative Note    Teri Mendez   : 1985     Date of service/delivery: 17   Place of delivery: [facility]    Pre-operative Diagnosis:  at 36w5d                                            Cholestasis of pregnancy with bile acids of 103 and elevated liver function studies                                           Failed induction of labor                                           Non-reassuring fetal tracing with induction agents    Post-operative Diagnosis: Same, delivered    Procedure done: primary LTCS    Surgeon: Jazmin Hogue MD    Assistant: None    Anesthesia: combined spinal-epidural    Complications:  None    EBL: 600 mL    IV fluids: crystalloid    Drains: bender catheter draining clear, yellow urine    Findings:  Viable male infant in MOISES presentation and cephalic position  Weight 7lbs 1oz  APGARS- 8/9  3-V cord  Normal appearing uterus, fallopian tubes and ovaries.      Indications:   Patient is a  at 36w5d , who was admitted for induction of labor for cholestasis of pregnancy diagnosed with bile acids of 103 and elevated liver function studies.  MFM was consulted and in agreement to proceed with induction of labor at this time.  Baby is known to be cephalic and estimated by be 6.5#.  After over 48 hours of attempting cervical ripening with no progress and occasional episodes of late decelerations, decision was made to proceed with  section.  Risks, benefits, and alternatives to the procedure were discussed and all questions were answered to the patient's satisfaction. Consent was reviewed and signed with a witness present.    Procedure Details:  IV antibiotics given per protocol.  SCD placed for VTE prophylaxis.  Spinal anesthesia administered, checked and found to be adequate.  Fetal heart tones obtained and reassuring.  Bender catheter was in place.    The patient was draped and prepped in the usual sterile manner in the dorsal  supine position with a left lateral tilt.  After a time-out was performed with all members of the surgical team in agreement, a Pfannenstiel incision was made with a scalpel and carried down through the subcutaneous tissue to the fascia. The fascia was then nicked on either side of the midline using a scalpel, and the facial incision was extended laterally using curved Reed scissors.  The fascia was then grasped with kocher clamps and  from the underlying rectus tissue superiorly and inferiorly using both blunt and sharp dissection.     The peritoneum was identified and entered bluntly, and stretched laterally for adequate visualization of abdominal structures.  Peritoneal incision was extended with careful visualization of bladder.  The bladder blade was inserted.  The utero-vesical peritoneal reflection was opened sharply and extended laterally.  The bladder blade was then replaced to displace the bladder from the surgical field.  At this time, the uterus was visualized and hand was placed into the abdomen to palpate fetal lie and determine the level of the fetal head.     A transverse incision made in the lower uterine segment above the bladder using a scalpel until fetal membranes were visualized.  The membranes were ruptured for clear fluid.  A hand was placed through the hysterotomy for elevation of the fetal head, and the infant was delivered in MOISES presentation.  Nose and mouth were bulb-suctioned, and the cord was clamped and cut and the infant was handed off to nursing staff for further assessment.  Cord blood was sampled.  The placenta was next delivered without difficulty using gentle manual traction on the cord.      At this time, the uterus was exteriorized and any remaining membranes were swept from the uterine wall using a laparotomy pad.  The uterine incision was next closed with running locked sutures of 0 Vicryl on a CTX needle. A second imbricating layer was then placed for reinforcement  and for better hemostasis.  At this time, excellent hemostasis was observed.  Next, copious irrigation and suctioning of the peritoneal cavity was carried out.  The uterus was placed back into the abdomen.  Para-colic gutters were cleared of all clots and debris.  The hysterotomy once again checked to ascertain hemostasis.          The fascia was closed with running sutures of 0 Vicryl on a CT-1 needle.  Subcutaneous tissue re-approximated with plain catgut.  The skin was closed in a subcuticular fashion with 4-0 monocryl.  Dermabond was then placed over the incision.  At this time, the case was completed.  After frog-legging the patient at the completion of the procedure, firm fundal pressure was applied and uterine clot was evacuated, with the uterus firm at the umbilicus.      Instrument, sponge, and needle counts were correct x 3     The mother and infant were brought back to the room in stable condition.    Jazmin Hogue MD

## 2017-09-20 NOTE — ANESTHESIA CARE TRANSFER NOTE
Patient: Teri Mendez    Procedure(s):   - Wound Class: II-Clean Contaminated    Diagnosis: pregnancy  Diagnosis Additional Information: No value filed.    Anesthesia Type:   Spinal     Note:  Airway :Room Air  Patient transferred to:Labor and Delivery        Vitals: (Last set prior to Anesthesia Care Transfer)    CRNA VITALS  9/20/2017 1818 - 9/20/2017 1850      9/20/2017             Resp Rate (observed): (!)  2    Resp Rate (set): 10                Electronically Signed By: GAYE Mascorro CRNA  September 20, 2017  6:50 PM

## 2017-09-20 NOTE — PLAN OF CARE
VSS. Uterine contractions noted every 2-4minutes. Pt c/o mild cramping with uterine contractions. Pitocin able to be titrated to 4Mu/hr at this time. FHTs moderate variability, +accels, no decels noted. Betamethasone administered at 2100. 100mg Vistaril po administered. All questions answered. Pt and spouse denies questions and concerns at this time. Cont to monitor and assess.

## 2017-09-20 NOTE — ANESTHESIA PREPROCEDURE EVALUATION
Anesthesia Evaluation     . Pt has had prior anesthetic.     History of anesthetic complications   - PONV        ROS/MED HX    ENT/Pulmonary:      (-) sleep apnea   Neurologic:       Cardiovascular:         METS/Exercise Tolerance:     Hematologic:         Musculoskeletal:         GI/Hepatic:     (+) Other GI/Hepatic      Bowel prep: Cholestasis of pregnancy.   Renal/Genitourinary:         Endo:         Psychiatric:         Infectious Disease:         Malignancy:         Other:                     Physical Exam  Normal systems: cardiovascular, pulmonary and dental    Airway   Mallampati: I  TM distance: >3 FB  Neck ROM: full    Dental     Cardiovascular   Rhythm and rate: regular and normal      Pulmonary    breath sounds clear to auscultation                    Anesthesia Plan      History & Physical Review  History and physical reviewed and following examination; no interval change.    ASA Status:  2 .    NPO Status:  > 8 hours    Plan for Spinal   PONV prophylaxis:  Ondansetron (or other 5HT-3)       Postoperative Care  Postoperative pain management:  Multi-modal analgesia.      Consents  Anesthetic plan, risks, benefits and alternatives discussed with:  Patient..                        Procedure: Procedure(s):   SECTION  Preop diagnosis: pregnancy    No Known Allergies  Past Medical History:   Diagnosis Date     Cervical cancer (H)      PONV (postoperative nausea and vomiting)      Past Surgical History:   Procedure Laterality Date     DILATION AND CURETTAGE SUCTION N/A 2016    Procedure: DILATION AND CURETTAGE SUCTION;  Surgeon: Marilou Kimble MD;  Location: [facility]     ORTHOPEDIC SURGERY       Prior to Admission medications    Medication Sig Start Date End Date Taking? Authorizing Provider   doxylamine (UNISOM) 25 MG TABS tablet Take 25 mg by mouth nightly as needed   Yes Reported, Patient   Prenatal Vit-DSS-Fe Fum-FA (PNV FE FUM/DOCUSATE/FOLIC ACID) 29-1 MG TABS    Yes Reported, Patient    acetaminophen (TYLENOL) 325 MG tablet Take 2 tablets (650 mg) by mouth every 4 hours as needed for other (mild pain) 1/8/16   Marilou Kimble MD   ibuprofen (ADVIL,MOTRIN) 600 MG tablet Take 1 tablet (600 mg) by mouth every 6 hours as needed for pain (mild) 1/8/16   Marilou Kimble MD   acetaminophen-codeine (TYLENOL W/CODEINE NO. 3) 300-30 MG per tablet Take 1-2 tablets by mouth every 4 hours as needed for mild pain 1/8/16   Marilou Kimble MD   doxycycline (VIBRA-TABS) 100 MG tablet Take 1 tablet (100 mg) by mouth 2 times daily 1/8/16   Marilou Kimble MD     Current Facility-Administered Medications Ordered in Epic   Medication Dose Route Frequency Last Rate Last Dose     lactated ringers infusion   Intravenous Continuous         ceFAZolin (ANCEF) 1 g vial to attach to  ml bag for ADULT or 50 ml bag for PEDS  1 g Intravenous See Admin Instructions         morphine (PF) (ASTRAMORPH /DURAMORPH) 1 mg/mL (PF) injection             ondansetron (ZOFRAN) injection    PRN   4 mg at 09/20/17 1747     phenylephrine (MAYO-SYNEPHRINE) injection 1 mg  1 mg  Continuous PRN   100 mcg at 09/20/17 1813     oxytocin (PITOCIN) 30 units in 500 mL 0.9% NaCl infusion    Continuous  mL/hr at 09/20/17 1811 340 mL/hr at 09/20/17 1811     lactated ringers infusion   Intravenous Continuous 125 mL/hr at 09/20/17 1342       prenatal multivitamin plus iron per tablet 1 tablet  1 tablet Oral Daily   1 tablet at 09/20/17 0856     betamethasone acet & sod phos (CELESTONE) injection 12 mg  12 mg Intramuscular Daily   12 mg at 09/19/17 2053     hydrOXYzine (ATARAX) tablet  mg   mg Oral Q6H PRN   100 mg at 09/20/17 1341     No abx ordered pre-op    PRN   1 each at 01/08/16 0915     No current [facility]-ordered outpatient prescriptions on file.     Wt Readings from Last 1 Encounters:   09/18/17 64.4 kg (142 lb)     Temp Readings from Last 1 Encounters:   09/20/17 37.4  C (99.4  F) (Temporal)     BP Readings  from Last 6 Encounters:   09/20/17 124/78   01/08/16 107/73     Pulse Readings from Last 4 Encounters:   09/20/17 86   01/08/16 101     Resp Readings from Last 1 Encounters:   09/20/17 18     SpO2 Readings from Last 1 Encounters:   01/08/16 97%     No results for input(s): NA, POTASSIUM, CHLORIDE, CO2, ANIONGAP, GLC, BUN, CR, VILMA in the last 96474 hours.  Recent Labs   Lab Test  09/20/17   1500   AST  91*   ALT  163*     Recent Labs   Lab Test  09/20/17   0715   WBC  9.2   HGB  11.4*   PLT  222     Recent Labs   Lab Test  09/20/17   1500   INR  0.87      No results for input(s): TROPI in the last 77520 hours.  RECENT LABS:   ECG:   ECHO:   CXR:

## 2017-09-20 NOTE — PLAN OF CARE
Cytotec inserted @0850. Cervix closed, posterior, high. Dr. Hogue notified @1013 of FHTs; repetitive LD, minimal variability, ctx q8min and fluid bolus given; FHTs improved. Orders received to hold any further doses of cytotec, check pt's cervix @1250 and call MD with details. RN discussed POC with pt & ; pt made NPO; last ate @0900. Cervix still closed, posterior, high @1250. Dr. Hogue notified @1257. Decision made to proceed with C/S around 1700. RN discussed POC per MD with pt & , all questions answered, pt &  agree to POC; felt like they had talked with Dr. Hogue re: C/S previously and felt all questions/concerns were addressed. Continuous EFM until C/S.

## 2017-09-20 NOTE — ANESTHESIA PROCEDURE NOTES
Peripheral nerve/Neuraxial procedure note : intrathecal  Pre-Procedure  Performed by CONY HARRISON  Location: OR      Pre-Anesthestic Checklist: patient identified, IV checked, risks and benefits discussed, informed consent, monitors and equipment checked, pre-op evaluation and at physician/surgeon's request    Timeout  Correct Patient: Yes   Correct Procedure: Yes   Correct Site: Yes   Correct Laterality: N/A   Correct Position: Yes   Site Marked: N/A   .   Procedure Documentation  ASA 2  .    Procedure:    Intrathecal.  Insertion Site:L3-4  (midline approach)      Patient Prep;mask, sterile gloves, povidone-iodine 7.5% surgical scrub, patient draped.  .  Needle: Norbert tip Spinal Needle (gauge): 25  Spinal/LP Needle Length (inches): 3.5 # of attempts: 1 and # of redirects:  Introducer used Introducer: 20 G .       Assessment/Narrative  Paresthesias: No.  .  .  clear CSF fluid removed .

## 2017-09-20 NOTE — PROGRESS NOTES
Dr. Cooley at bedside discussing POC with patient and spouse. Plan to start cytotec x3 doses. Will continue to monitor closely. If cervix does not change by 1700, may proceed to a  section if patient and spouse agree to plan.   0815: SBAR report given to Minnie Soriano RN

## 2017-09-20 NOTE — PLAN OF CARE
0600-RN at bedside due to LD, patient found to be mostly laying on back. Patient to bathroom and repositioned to LL. Pitocin continues to be at 7mu. Will continue to monitor and decrease if decels continue after repositioning.   0635-Patient repositioned to RL  0645- in department, visualized strip plan to decrease pitocin to 4mu and update Dr. Hogue  0655-Dr. Hogue updated on fht, contractions, SVE, patient status and overall status of night. Plan to stop pitocin and MD will come to bedside to assess patient and make further plan. Labs ordered.   Updated Patient on plan and pitocin stopped.

## 2017-09-21 LAB — HGB BLD-MCNC: 9 G/DL (ref 11.7–15.7)

## 2017-09-21 PROCEDURE — 25000128 H RX IP 250 OP 636: Performed by: OBSTETRICS & GYNECOLOGY

## 2017-09-21 PROCEDURE — 25000132 ZZH RX MED GY IP 250 OP 250 PS 637: Performed by: OBSTETRICS & GYNECOLOGY

## 2017-09-21 PROCEDURE — 12000037 ZZH R&B POSTPARTUM INTERMEDIATE

## 2017-09-21 PROCEDURE — 36415 COLL VENOUS BLD VENIPUNCTURE: CPT | Performed by: OBSTETRICS & GYNECOLOGY

## 2017-09-21 PROCEDURE — 85018 HEMOGLOBIN: CPT | Performed by: OBSTETRICS & GYNECOLOGY

## 2017-09-21 PROCEDURE — 25000128 H RX IP 250 OP 636: Performed by: ANESTHESIOLOGY

## 2017-09-21 RX ORDER — IBUPROFEN 400 MG/1
400-800 TABLET, FILM COATED ORAL EVERY 6 HOURS PRN
Status: DISCONTINUED | OUTPATIENT
Start: 2017-09-21 | End: 2017-09-23 | Stop reason: HOSPADM

## 2017-09-21 RX ORDER — BREAST PUMP
1 EACH MISCELLANEOUS ONCE
Qty: 1 EACH | Refills: 0 | Status: SHIPPED | OUTPATIENT
Start: 2017-09-21 | End: 2017-09-21

## 2017-09-21 RX ADMIN — IBUPROFEN 800 MG: 400 TABLET ORAL at 20:14

## 2017-09-21 RX ADMIN — SENNOSIDES AND DOCUSATE SODIUM 1 TABLET: 8.6; 5 TABLET ORAL at 13:04

## 2017-09-21 RX ADMIN — NALBUPHINE HYDROCHLORIDE 2.5 MG: 10 INJECTION, SOLUTION INTRAMUSCULAR; INTRAVENOUS; SUBCUTANEOUS at 03:50

## 2017-09-21 RX ADMIN — KETOROLAC TROMETHAMINE 30 MG: 30 INJECTION, SOLUTION INTRAMUSCULAR at 06:00

## 2017-09-21 RX ADMIN — OXYCODONE HYDROCHLORIDE 10 MG: 5 TABLET ORAL at 21:07

## 2017-09-21 RX ADMIN — IBUPROFEN 800 MG: 400 TABLET ORAL at 13:04

## 2017-09-21 RX ADMIN — SODIUM CHLORIDE, SODIUM LACTATE, POTASSIUM CHLORIDE, CALCIUM CHLORIDE, AND DEXTROSE MONOHYDRATE: 600; 310; 30; 20; 5 INJECTION, SOLUTION INTRAVENOUS at 03:56

## 2017-09-21 RX ADMIN — SENNOSIDES AND DOCUSATE SODIUM 2 TABLET: 8.6; 5 TABLET ORAL at 20:14

## 2017-09-21 RX ADMIN — OXYCODONE HYDROCHLORIDE 5 MG: 5 TABLET ORAL at 13:57

## 2017-09-21 RX ADMIN — ACETAMINOPHEN 975 MG: 325 TABLET, FILM COATED ORAL at 02:25

## 2017-09-21 RX ADMIN — OXYCODONE HYDROCHLORIDE 5 MG: 5 TABLET ORAL at 18:04

## 2017-09-21 RX ADMIN — KETOROLAC TROMETHAMINE 30 MG: 30 INJECTION, SOLUTION INTRAMUSCULAR at 00:53

## 2017-09-21 RX ADMIN — ACETAMINOPHEN 975 MG: 325 TABLET, FILM COATED ORAL at 11:33

## 2017-09-21 RX ADMIN — ACETAMINOPHEN 975 MG: 325 TABLET, FILM COATED ORAL at 18:04

## 2017-09-21 NOTE — PROGRESS NOTES
"Teri Mendez  September 21, 2017    S: pt is doing well.  Tolerating po intake and pain is well controlled.  pumping    O:/76  Pulse 86  Temp 97.9  F (36.6  C) (Oral)  Resp 18  Ht 1.676 m (5' 6\")  Wt 64.4 kg (142 lb)  SpO2 98%  Breastfeeding? Unknown  BMI 22.92 kg/m2    Recent Labs  Lab 09/20/17  0715   HGB 11.4*     Abdomen: soft, non distended, fundus firm below the umbilicus.  Incision is C/D/I  Ext: non tender, no edema or erythema    A/P: s/p LTCS POD #1  Doing well  Continue care  Discharge planning for saturday    Tramaine Lozano    "

## 2017-09-21 NOTE — PLAN OF CARE
Problem: Goal Outcome Summary  Goal: Goal Outcome Summary  Outcome: No Change  VSS, pain well controlled. FF@U, one golf ball sized clot upon arriving to floor (pad and clot weighed for 110g).  No further clots.  Justice patent.  Nubain given x1 for itching.  Tolerating clears.  Safety and education reviewed with patient and .

## 2017-09-21 NOTE — PLAN OF CARE
Problem: Goal Outcome Summary  Goal: Goal Outcome Summary  Outcome: No Change  VS WDL. Pain controlled with scheduled toradol and tylenol. Fundus firm, small flow. One small clot with 05:45 check. Incision C/D/I. Ambulated to bathroom with assist of 2.  Denies any dizziness or nausea. BF going well, started pumping. Bonding well with baby. Justice patent with adequate output. IVF infusing. Nubain x1 given for itching. On pathway. Continue to monitor with current plan of care.

## 2017-09-21 NOTE — LACTATION NOTE
Initial Lactation visit. Hand out given. Recommend unlimited, frequent breast feedings: At least 8 - 12 times every 24 hours. Avoid pacifiers and supplementation with formula unless medically indicated. Explained benefits of holding baby skin on skin to help promote better breastfeeding outcomes. Infant is late .  Latching and feeding well, blood sugars are just meeting criteria.  Reviewed with pt feeding expectations and feeding plan.  Introduced donor milk and finger feeding after breast feeding.  Infant finger fed well.  Teri pumped after breast feeding and got about 1ml of colostrum.  Encouraged her to pump after feedings each time.  Teri was very anxious and had many questions.  She stated she felt she was not getting the correct advice overnight since they were not supplementing then.  Discussed with her and her  indications for supplementation, normal process of feedings and that it was not appropriate to supplement overnight as blood sugars were above the minimum but that since it was only 35 this morning we were going to start to ensure it does not drop further.  She felt reassured after discussion.  Will continue to follow.      Mali Antunez RN, IBCLC

## 2017-09-21 NOTE — PLAN OF CARE
Problem: Goal Outcome Summary  Goal: Goal Outcome Summary  Outcome: Improving  VSS. Pain controlled. Up and walking. Justice removed. Fundus firm, scant flow. Pumping after each feed. Lanolin given. Continue to monitor.

## 2017-09-21 NOTE — ANESTHESIA POSTPROCEDURE EVALUATION
Patient: Teri Mendez    Procedure(s):   - Wound Class: II-Clean Contaminated    Diagnosis:pregnancy  Diagnosis Additional Information: No value filed.    Anesthesia Type:  Spinal    Note:  Anesthesia Post Evaluation    Patient location during evaluation: PACU  Patient participation: Able to fully participate in evaluation  Level of consciousness: awake  Pain management: adequate  Airway patency: patent  Cardiovascular status: acceptable  Respiratory status: acceptable  Hydration status: acceptable  PONV: none     Anesthetic complications: None          Last vitals:  Vitals:    09/20/17 1130 09/20/17 1643 09/20/17 1855   BP: 124/73 124/78 124/72   Pulse: 86     Resp: 16 18    Temp: 37.4  C (99.4  F)  36.5  C (97.7  F)   SpO2:   97%         Electronically Signed By: Marilou Branch MD, MD  September 20, 2017  7:23 PM

## 2017-09-22 LAB — COPATH REPORT: NORMAL

## 2017-09-22 PROCEDURE — 25000132 ZZH RX MED GY IP 250 OP 250 PS 637: Performed by: OBSTETRICS & GYNECOLOGY

## 2017-09-22 PROCEDURE — 12000037 ZZH R&B POSTPARTUM INTERMEDIATE

## 2017-09-22 RX ADMIN — OXYCODONE HYDROCHLORIDE 10 MG: 5 TABLET ORAL at 08:45

## 2017-09-22 RX ADMIN — IBUPROFEN 800 MG: 400 TABLET ORAL at 08:45

## 2017-09-22 RX ADMIN — ACETAMINOPHEN 975 MG: 325 TABLET, FILM COATED ORAL at 02:11

## 2017-09-22 RX ADMIN — ACETAMINOPHEN 975 MG: 325 TABLET, FILM COATED ORAL at 19:29

## 2017-09-22 RX ADMIN — ACETAMINOPHEN 975 MG: 325 TABLET, FILM COATED ORAL at 11:37

## 2017-09-22 RX ADMIN — OXYCODONE HYDROCHLORIDE 10 MG: 5 TABLET ORAL at 19:29

## 2017-09-22 RX ADMIN — SENNOSIDES AND DOCUSATE SODIUM 2 TABLET: 8.6; 5 TABLET ORAL at 08:46

## 2017-09-22 RX ADMIN — OXYCODONE HYDROCHLORIDE 10 MG: 5 TABLET ORAL at 16:27

## 2017-09-22 RX ADMIN — SIMETHICONE CHEW TAB 80 MG 80 MG: 80 TABLET ORAL at 18:23

## 2017-09-22 RX ADMIN — OXYCODONE HYDROCHLORIDE 10 MG: 5 TABLET ORAL at 04:31

## 2017-09-22 RX ADMIN — IBUPROFEN 800 MG: 400 TABLET ORAL at 16:27

## 2017-09-22 RX ADMIN — OXYCODONE HYDROCHLORIDE 10 MG: 5 TABLET ORAL at 11:35

## 2017-09-22 RX ADMIN — SENNOSIDES AND DOCUSATE SODIUM 2 TABLET: 8.6; 5 TABLET ORAL at 20:39

## 2017-09-22 RX ADMIN — OXYCODONE HYDROCHLORIDE 10 MG: 5 TABLET ORAL at 01:13

## 2017-09-22 RX ADMIN — OXYCODONE HYDROCHLORIDE 10 MG: 5 TABLET ORAL at 22:48

## 2017-09-22 RX ADMIN — IBUPROFEN 800 MG: 400 TABLET ORAL at 02:11

## 2017-09-22 RX ADMIN — IBUPROFEN 800 MG: 400 TABLET ORAL at 22:48

## 2017-09-22 RX ADMIN — BISACODYL 10 MG: 10 SUPPOSITORY RECTAL at 15:03

## 2017-09-22 NOTE — PLAN OF CARE
Pt up independently, 10 mg oxycodone and ibuprofen on hold for now as pt is trying to have a bowel movement, suppository offered but declined at this time.  Enc to call with needs, questions and concerns.

## 2017-09-22 NOTE — PROGRESS NOTES
"Teri Mendez  September 22, 2017  POD#2 s/p 1LTCS    S: Pt is doing well, no acute events overnight.  She is tolerating po intake and her pain is well controlled.  She is ambulating without difficulty and passing flatus.  She endorses decreasing lochia. She desires circumcision for her son, which the pediatrician is planning on doing.  She has no questions or concerns.    She is voiding spontaneously.  She denies SOB, chest pain, HA, nausea/vomiting, fevers/chills.  She is Breastfeeding without difficulty.  She has no complaints at this time.    O:/77  Pulse 92  Temp 98.6  F (37  C) (Oral)  Resp 16  Ht 1.676 m (5' 6\")  Wt 64.4 kg (142 lb)  SpO2 98%  Breastfeeding? Unknown  BMI 22.92 kg/m2    Recent Labs  Lab 09/21/17  0915 09/20/17  0715   HGB 9.0* 11.4*     Abdomen: soft, non distended, fundus firm 1 cm below the umbilicus.  Incision is C/D/I with suture, no signs of cellulitis or infection  Ext: non tender, no edema or erythema    A/P: s/p 1LTCS POD #2 - doing well post-partum.    Continue routine postpartum care  Lochia is minimal; Hgb stable  Encourage increased ambulation  Discussed contraceptive options, which will be re-addressed at 6 week post-partum visit  Discharge planning for tomorrow.    Jazmin Hogue MD    "

## 2017-09-22 NOTE — PLAN OF CARE
Problem: Goal Outcome Summary  Goal: Goal Outcome Summary  Outcome: No Change  Patient had good results from dulcolax suppository. Incision well approximated with liquid bandage. Fundus firm below umbilicus, lochia scant, few small clots per patient. Lanolin cream and sore nipple shells given for comfort. Using breast pump every 3 hours, pumped almost 20 ml the last time. Encouraged to call with questions/concerns.

## 2017-09-22 NOTE — PLAN OF CARE
Problem: Goal Outcome Summary  Goal: Goal Outcome Summary  Outcome: Improving   Vss, pain controlled with oxycodone, ibuprofen and tylenol. Voiding adequate amounts without difficulty.

## 2017-09-22 NOTE — LACTATION NOTE
Follow up visit.  Infant latching and feeding well.  Supplementing after breast with donor milk.  Pumping every 3 hours and getting small amounts of colostrum.  Plan is to continue with feedings.  Teri had no questions about feeding plan and felt comfortable with how things are going.  Will continue to follow.  Mali Antunez  RN, IBCLC

## 2017-09-22 NOTE — PLAN OF CARE
Problem: Goal Outcome Summary  Goal: Goal Outcome Summary  Outcome: No Change  On pathway. Breastfeeding baby and pumping [id number]. Pain well controlled with oral pain meds.

## 2017-09-22 NOTE — PLAN OF CARE
Problem: Goal Outcome Summary  Goal: Goal Outcome Summary  Outcome: No Change  VSS, pain well controlled with Tylenol, Ibuprofen and Oxycodone.  Ambulating and voiding without difficulty.  Incision intact with Dermabond.  FF @U/1, scant flow.  Abdominal binder on.

## 2017-09-23 VITALS
TEMPERATURE: 97.9 F | HEIGHT: 66 IN | SYSTOLIC BLOOD PRESSURE: 120 MMHG | OXYGEN SATURATION: 98 % | BODY MASS INDEX: 22.82 KG/M2 | RESPIRATION RATE: 16 BRPM | HEART RATE: 92 BPM | DIASTOLIC BLOOD PRESSURE: 92 MMHG | WEIGHT: 142 LBS

## 2017-09-23 PROCEDURE — 25000132 ZZH RX MED GY IP 250 OP 250 PS 637: Performed by: OBSTETRICS & GYNECOLOGY

## 2017-09-23 RX ORDER — OXYCODONE HYDROCHLORIDE 5 MG/1
5-10 TABLET ORAL
Qty: 30 TABLET | Refills: 0 | Status: ON HOLD | OUTPATIENT
Start: 2017-09-23 | End: 2021-07-13

## 2017-09-23 RX ADMIN — ACETAMINOPHEN 975 MG: 325 TABLET, FILM COATED ORAL at 02:52

## 2017-09-23 RX ADMIN — ACETAMINOPHEN 975 MG: 325 TABLET, FILM COATED ORAL at 10:50

## 2017-09-23 RX ADMIN — OXYCODONE HYDROCHLORIDE 10 MG: 5 TABLET ORAL at 02:52

## 2017-09-23 RX ADMIN — IBUPROFEN 800 MG: 400 TABLET ORAL at 12:46

## 2017-09-23 RX ADMIN — SENNOSIDES AND DOCUSATE SODIUM 1 TABLET: 8.6; 5 TABLET ORAL at 09:46

## 2017-09-23 RX ADMIN — OXYCODONE HYDROCHLORIDE 10 MG: 5 TABLET ORAL at 09:47

## 2017-09-23 RX ADMIN — OXYCODONE HYDROCHLORIDE 10 MG: 5 TABLET ORAL at 12:46

## 2017-09-23 RX ADMIN — IBUPROFEN 800 MG: 400 TABLET ORAL at 06:35

## 2017-09-23 RX ADMIN — OXYCODONE HYDROCHLORIDE 10 MG: 5 TABLET ORAL at 06:35

## 2017-09-23 NOTE — PLAN OF CARE
Problem: Goal Outcome Summary  Goal: Goal Outcome Summary  Outcome: Improving  VSS.  Incision C/D/I.  Up to bathroom independently, tolerated well.  Pain well controlled, requesting prn pain meds as needed.  Working on breastfeeding  and  cares.  On pathway. Continue to monitor and notify MD as needed.

## 2017-09-23 NOTE — LACTATION NOTE
Follow up visit.  Infant breast feeding well.  Milk is coming in.  Plan is to start offering supplement of ebm via bottle after breast feeding with milk coming in.  Encouraged Teri and her  to wake baby every 3 hours and offer supplement until they see the pediatrician and see how baby is doing.  Teri is getting a pump for home use today.  She had questions about storing breast milk, reviewed information with her and showed her the chart in the book.  No further questions at this time.   Mali Antunez  RN, IBCLC

## 2017-09-23 NOTE — PROGRESS NOTES
"Postpartum Note    S: Pt doing well. Pain is well controlled.  No concerns.    O: BP (!) 120/92  Pulse 92  Temp 97.9  F (36.6  C) (Oral)  Resp 16  Ht 1.676 m (5' 6\")  Wt 64.4 kg (142 lb)  SpO2 98%  Breastfeeding? Unknown  BMI 22.92 kg/m2        ABD:  Uterine fundus is firm, non-tender and at the level of the umbilicus  incision c/d/i         Hemoglobin   Date Value Ref Range Status   09/21/2017 9.0 (L) 11.7 - 15.7 g/dL Final       A: Post-partum day #3 s/p C/Section, doing well    P: Continue PP Cares. Anticipate discharge today, rtc 2 and 6wks            "

## 2017-09-23 NOTE — PLAN OF CARE
Problem: Goal Outcome Summary  Goal: Goal Outcome Summary  Outcome: Adequate for Discharge Date Met:  09/23/17  Patient doing well, having minimal incisional discomfort. Taking ibuprofen, tylenol, and oxycodone PO with good relief. Fundus firm below umbilicus, lochia scant. Incision well approximated with liquid bandage. Independent with self and infant cares. Discharge instructions explained to patient and spouse and all questions/concerns addressed. Rental breast pump given per patient request. Patient already has breast pump prescription, ordering out side of hospital.

## 2017-09-29 ENCOUNTER — HOSPITAL ENCOUNTER (OUTPATIENT)
Facility: CLINIC | Age: 32
End: 2017-09-29
Admitting: OBSTETRICS & GYNECOLOGY
Payer: COMMERCIAL

## 2017-11-02 NOTE — DISCHARGE SUMMARY
[facility] Discharge Summary    Teri Mendez MRN# [id number]   Age: 32 year old YOB: 1985     Date of Admission:  2017  Date of Discharge::  2017  2:54 PM   Admitting Physician:  Jazmin Hogue MD  Discharge Physician:  Jazmin Hogue MD     Home clinic: [facility]          Admission Diagnoses:   Active labor          Discharge Diagnosis:    section          Procedures:   Procedure(s): 1LTCS              Medications Prior to Admission:     No prescriptions prior to admission.             Discharge Medications:     Discharge Medication List as of 2017  1:33 PM      START taking these medications    Details   oxyCODONE (ROXICODONE) 5 MG IR tablet Take 1-2 tablets (5-10 mg) by mouth every 3 hours as needed for moderate to severe pain, Disp-30 tablet, R-0, Local Print         CONTINUE these medications which have NOT CHANGED    Details   doxylamine (UNISOM) 25 MG TABS tablet Take 25 mg by mouth nightly as needed, Historical      Prenatal Vit-DSS-Fe Fum-FA (PNV FE FUM/DOCUSATE/FOLIC ACID) 29-1 MG TABS Historical      acetaminophen (TYLENOL) 325 MG tablet Take 2 tablets (650 mg) by mouth every 4 hours as needed for other (mild pain), Disp-100 tablet, R-0, OTC      ibuprofen (ADVIL,MOTRIN) 600 MG tablet Take 1 tablet (600 mg) by mouth every 6 hours as needed for pain (mild), Disp-30 tablet, R-0, OTC      doxycycline (VIBRA-TABS) 100 MG tablet Take 1 tablet (100 mg) by mouth 2 times daily, Disp-10 tablet, R-0, Local Print         STOP taking these medications       Misc. Devices (BREAST PUMP) MISC Comments:   Reason for Stopping:         acetaminophen-codeine (TYLENOL W/CODEINE NO. 3) 300-30 MG per tablet Comments:   Reason for Stopping:                     Consultations:   No consultations were requested during this admission          Brief History of Labor:   Please see delivery note.           Hospital Course:   The patient's hospital course was  unremarkable.  She recovered as anticipated and experienced no post-operative complications.  Upon discharge, her pain was well controlled. Vaginal bleeding is mild to moderate.  Voiding without difficulty.  Ambulating well and tolerating a normal diet.  No fever or significant wound drainage.  Breastfeeding well.  Infant is stable.  She had a bowel movement prior to discharge.  She was discharged on post-partum day #3.    Post-partum hemoglobin:   Hemoglobin   Date Value Ref Range Status   09/21/2017 9.0 (L) 11.7 - 15.7 g/dL Final             Discharge Instructions and Follow-Up:   Discharge diet: Regular   Discharge activity: Activity as tolerated, no lifting greater than 10 lbs   Discharge follow-up: Follow up with consultant in 6 weeks for post partum visit   Wound care: Keep wound clean and dry           Discharge Disposition:   Discharged to home      Attestation:  I have reviewed today's vital signs, notes, medications, labs and imaging.  Amount of time performed on this discharge summary: 10 minutes.    Jazmin Hogue MD

## 2021-06-08 DIAGNOSIS — Z11.59 ENCOUNTER FOR SCREENING FOR OTHER VIRAL DISEASES: ICD-10-CM

## 2021-07-12 LAB
GROUP B STREPTOCOCCUS (EXTERNAL): NEGATIVE
HIV1+2 AB SERPL QL IA: NONREACTIVE

## 2021-07-13 ENCOUNTER — ANESTHESIA (OUTPATIENT)
Dept: OBGYN | Facility: CLINIC | Age: 36
End: 2021-07-13
Payer: COMMERCIAL

## 2021-07-13 ENCOUNTER — HOSPITAL ENCOUNTER (INPATIENT)
Facility: CLINIC | Age: 36
LOS: 3 days | Discharge: HOME | End: 2021-07-16
Attending: OBSTETRICS & GYNECOLOGY | Admitting: OBSTETRICS & GYNECOLOGY
Payer: COMMERCIAL

## 2021-07-13 ENCOUNTER — ANESTHESIA EVENT (OUTPATIENT)
Dept: OBGYN | Facility: CLINIC | Age: 36
End: 2021-07-13
Payer: COMMERCIAL

## 2021-07-13 DIAGNOSIS — Z98.891 S/P PRIMARY LOW TRANSVERSE C-SECTION: Primary | ICD-10-CM

## 2021-07-13 DIAGNOSIS — O26.643 CHOLESTASIS DURING PREGNANCY IN THIRD TRIMESTER: ICD-10-CM

## 2021-07-13 DIAGNOSIS — O09.523 MULTIGRAVIDA OF ADVANCED MATERNAL AGE IN THIRD TRIMESTER: ICD-10-CM

## 2021-07-13 DIAGNOSIS — O34.219 PREVIOUS CESAREAN DELIVERY, ANTEPARTUM CONDITION OR COMPLICATION: ICD-10-CM

## 2021-07-13 DIAGNOSIS — O34.219 PREVIOUS CESAREAN DELIVERY, DELIVERED: ICD-10-CM

## 2021-07-13 LAB
ABO/RH(D): NORMAL
ANTIBODY SCREEN: NEGATIVE
HGB BLD-MCNC: 11.8 G/DL (ref 11.7–15.7)
SARS-COV-2 RNA RESP QL NAA+PROBE: NEGATIVE
SPECIMEN EXPIRATION DATE: NORMAL
T PALLIDUM AB SER QL: NONREACTIVE

## 2021-07-13 PROCEDURE — 250N000009 HC RX 250: Performed by: NURSE ANESTHETIST, CERTIFIED REGISTERED

## 2021-07-13 PROCEDURE — 272N000001 HC OR GENERAL SUPPLY STERILE: Performed by: OBSTETRICS & GYNECOLOGY

## 2021-07-13 PROCEDURE — 85018 HEMOGLOBIN: CPT | Performed by: PHYSICIAN ASSISTANT

## 2021-07-13 PROCEDURE — 258N000003 HC RX IP 258 OP 636: Performed by: PHYSICIAN ASSISTANT

## 2021-07-13 PROCEDURE — 86900 BLOOD TYPING SEROLOGIC ABO: CPT | Performed by: PHYSICIAN ASSISTANT

## 2021-07-13 PROCEDURE — 258N000003 HC RX IP 258 OP 636: Performed by: NURSE ANESTHETIST, CERTIFIED REGISTERED

## 2021-07-13 PROCEDURE — 250N000011 HC RX IP 250 OP 636: Performed by: OBSTETRICS & GYNECOLOGY

## 2021-07-13 PROCEDURE — 250N000011 HC RX IP 250 OP 636: Performed by: NURSE ANESTHETIST, CERTIFIED REGISTERED

## 2021-07-13 PROCEDURE — 86780 TREPONEMA PALLIDUM: CPT | Performed by: PHYSICIAN ASSISTANT

## 2021-07-13 PROCEDURE — 250N000011 HC RX IP 250 OP 636: Performed by: PHYSICIAN ASSISTANT

## 2021-07-13 PROCEDURE — 370N000017 HC ANESTHESIA TECHNICAL FEE, PER MIN: Performed by: OBSTETRICS & GYNECOLOGY

## 2021-07-13 PROCEDURE — 360N000076 HC SURGERY LEVEL 3, PER MIN: Performed by: OBSTETRICS & GYNECOLOGY

## 2021-07-13 PROCEDURE — 250N000013 HC RX MED GY IP 250 OP 250 PS 637

## 2021-07-13 PROCEDURE — 710N000009 HC RECOVERY PHASE 1, LEVEL 1, PER MIN: Performed by: OBSTETRICS & GYNECOLOGY

## 2021-07-13 PROCEDURE — C9803 HOPD COVID-19 SPEC COLLECT: HCPCS

## 2021-07-13 PROCEDURE — 36415 COLL VENOUS BLD VENIPUNCTURE: CPT | Performed by: PHYSICIAN ASSISTANT

## 2021-07-13 PROCEDURE — 250N000013 HC RX MED GY IP 250 OP 250 PS 637: Performed by: PHYSICIAN ASSISTANT

## 2021-07-13 PROCEDURE — 250N000013 HC RX MED GY IP 250 OP 250 PS 637: Performed by: OBSTETRICS & GYNECOLOGY

## 2021-07-13 PROCEDURE — 120N000012 HC R&B POSTPARTUM

## 2021-07-13 PROCEDURE — 87635 SARS-COV-2 COVID-19 AMP PRB: CPT | Performed by: OBSTETRICS & GYNECOLOGY

## 2021-07-13 RX ORDER — FENTANYL CITRATE 50 UG/ML
INJECTION, SOLUTION INTRAMUSCULAR; INTRAVENOUS PRN
Status: DISCONTINUED | OUTPATIENT
Start: 2021-07-13 | End: 2021-07-13

## 2021-07-13 RX ORDER — CEFAZOLIN SODIUM 2 G/100ML
INJECTION, SOLUTION INTRAVENOUS
Status: DISCONTINUED
Start: 2021-07-13 | End: 2021-07-13 | Stop reason: HOSPADM

## 2021-07-13 RX ORDER — ACETAMINOPHEN 325 MG/1
975 TABLET ORAL ONCE
Status: COMPLETED | OUTPATIENT
Start: 2021-07-13 | End: 2021-07-13

## 2021-07-13 RX ORDER — MISOPROSTOL 200 UG/1
400 TABLET ORAL PRN
Status: DISCONTINUED | OUTPATIENT
Start: 2021-07-13 | End: 2021-07-16 | Stop reason: HOSPADM

## 2021-07-13 RX ORDER — CITRIC ACID/SODIUM CITRATE 334-500MG
30 SOLUTION, ORAL ORAL
Status: DISCONTINUED | OUTPATIENT
Start: 2021-07-13 | End: 2021-07-13 | Stop reason: HOSPADM

## 2021-07-13 RX ORDER — ACETAMINOPHEN 325 MG/1
650 TABLET ORAL EVERY 4 HOURS PRN
Status: DISCONTINUED | OUTPATIENT
Start: 2021-07-16 | End: 2021-07-16 | Stop reason: HOSPADM

## 2021-07-13 RX ORDER — CEFAZOLIN SODIUM 2 G/100ML
2 INJECTION, SOLUTION INTRAVENOUS
Status: COMPLETED | OUTPATIENT
Start: 2021-07-13 | End: 2021-07-13

## 2021-07-13 RX ORDER — ACETAMINOPHEN 325 MG/1
975 TABLET ORAL EVERY 6 HOURS
Status: COMPLETED | OUTPATIENT
Start: 2021-07-13 | End: 2021-07-16

## 2021-07-13 RX ORDER — AMOXICILLIN 250 MG
1 CAPSULE ORAL 2 TIMES DAILY
Status: DISCONTINUED | OUTPATIENT
Start: 2021-07-13 | End: 2021-07-16 | Stop reason: HOSPADM

## 2021-07-13 RX ORDER — METOCLOPRAMIDE HYDROCHLORIDE 5 MG/ML
10 INJECTION INTRAMUSCULAR; INTRAVENOUS EVERY 6 HOURS PRN
Status: DISCONTINUED | OUTPATIENT
Start: 2021-07-13 | End: 2021-07-16 | Stop reason: HOSPADM

## 2021-07-13 RX ORDER — OXYTOCIN/0.9 % SODIUM CHLORIDE 30/500 ML
100 PLASTIC BAG, INJECTION (ML) INTRAVENOUS CONTINUOUS
Status: DISCONTINUED | OUTPATIENT
Start: 2021-07-13 | End: 2021-07-16 | Stop reason: HOSPADM

## 2021-07-13 RX ORDER — KETOROLAC TROMETHAMINE 30 MG/ML
30 INJECTION, SOLUTION INTRAMUSCULAR; INTRAVENOUS EVERY 6 HOURS
Status: COMPLETED | OUTPATIENT
Start: 2021-07-13 | End: 2021-07-14

## 2021-07-13 RX ORDER — LIDOCAINE 40 MG/G
CREAM TOPICAL
Status: DISCONTINUED | OUTPATIENT
Start: 2021-07-13 | End: 2021-07-16 | Stop reason: HOSPADM

## 2021-07-13 RX ORDER — NALOXONE HYDROCHLORIDE 0.4 MG/ML
0.2 INJECTION, SOLUTION INTRAMUSCULAR; INTRAVENOUS; SUBCUTANEOUS
Status: DISCONTINUED | OUTPATIENT
Start: 2021-07-13 | End: 2021-07-16 | Stop reason: HOSPADM

## 2021-07-13 RX ORDER — SCOLOPAMINE TRANSDERMAL SYSTEM 1 MG/1
1 PATCH, EXTENDED RELEASE TRANSDERMAL
Status: DISCONTINUED | OUTPATIENT
Start: 2021-07-13 | End: 2021-07-13

## 2021-07-13 RX ORDER — PROCHLORPERAZINE MALEATE 10 MG
10 TABLET ORAL EVERY 6 HOURS PRN
Status: DISCONTINUED | OUTPATIENT
Start: 2021-07-13 | End: 2021-07-16 | Stop reason: HOSPADM

## 2021-07-13 RX ORDER — PROCHLORPERAZINE 25 MG
25 SUPPOSITORY, RECTAL RECTAL EVERY 12 HOURS PRN
Status: DISCONTINUED | OUTPATIENT
Start: 2021-07-13 | End: 2021-07-16 | Stop reason: HOSPADM

## 2021-07-13 RX ORDER — ONDANSETRON 2 MG/ML
INJECTION INTRAMUSCULAR; INTRAVENOUS PRN
Status: DISCONTINUED | OUTPATIENT
Start: 2021-07-13 | End: 2021-07-13

## 2021-07-13 RX ORDER — ONDANSETRON 4 MG/1
4 TABLET, ORALLY DISINTEGRATING ORAL EVERY 6 HOURS PRN
Status: DISCONTINUED | OUTPATIENT
Start: 2021-07-13 | End: 2021-07-16 | Stop reason: HOSPADM

## 2021-07-13 RX ORDER — OXYCODONE HYDROCHLORIDE 5 MG/1
5 TABLET ORAL EVERY 4 HOURS PRN
Status: DISCONTINUED | OUTPATIENT
Start: 2021-07-13 | End: 2021-07-16 | Stop reason: HOSPADM

## 2021-07-13 RX ORDER — SCOLOPAMINE TRANSDERMAL SYSTEM 1 MG/1
PATCH, EXTENDED RELEASE TRANSDERMAL
Status: DISCONTINUED
Start: 2021-07-13 | End: 2021-07-13 | Stop reason: HOSPADM

## 2021-07-13 RX ORDER — MODIFIED LANOLIN
OINTMENT (GRAM) TOPICAL
Status: DISCONTINUED | OUTPATIENT
Start: 2021-07-13 | End: 2021-07-16 | Stop reason: HOSPADM

## 2021-07-13 RX ORDER — CEFAZOLIN SODIUM 1 G/3ML
1 INJECTION, POWDER, FOR SOLUTION INTRAMUSCULAR; INTRAVENOUS SEE ADMIN INSTRUCTIONS
Status: DISCONTINUED | OUTPATIENT
Start: 2021-07-13 | End: 2021-07-13 | Stop reason: HOSPADM

## 2021-07-13 RX ORDER — NALOXONE HYDROCHLORIDE 0.4 MG/ML
0.4 INJECTION, SOLUTION INTRAMUSCULAR; INTRAVENOUS; SUBCUTANEOUS
Status: DISCONTINUED | OUTPATIENT
Start: 2021-07-13 | End: 2021-07-16 | Stop reason: HOSPADM

## 2021-07-13 RX ORDER — SIMETHICONE 80 MG
80 TABLET,CHEWABLE ORAL 4 TIMES DAILY PRN
Status: DISCONTINUED | OUTPATIENT
Start: 2021-07-13 | End: 2021-07-16 | Stop reason: HOSPADM

## 2021-07-13 RX ORDER — MORPHINE SULFATE 1 MG/ML
INJECTION, SOLUTION EPIDURAL; INTRATHECAL; INTRAVENOUS PRN
Status: DISCONTINUED | OUTPATIENT
Start: 2021-07-13 | End: 2021-07-13

## 2021-07-13 RX ORDER — OXYTOCIN 10 [USP'U]/ML
10 INJECTION, SOLUTION INTRAMUSCULAR; INTRAVENOUS PRN
Status: DISCONTINUED | OUTPATIENT
Start: 2021-07-13 | End: 2021-07-16 | Stop reason: HOSPADM

## 2021-07-13 RX ORDER — HYDROCORTISONE 2.5 %
CREAM (GRAM) TOPICAL 3 TIMES DAILY PRN
Status: DISCONTINUED | OUTPATIENT
Start: 2021-07-13 | End: 2021-07-16 | Stop reason: HOSPADM

## 2021-07-13 RX ORDER — BISACODYL 10 MG
10 SUPPOSITORY, RECTAL RECTAL DAILY PRN
Status: DISCONTINUED | OUTPATIENT
Start: 2021-07-15 | End: 2021-07-16 | Stop reason: HOSPADM

## 2021-07-13 RX ORDER — HYDROMORPHONE HYDROCHLORIDE 1 MG/ML
.3-.5 INJECTION, SOLUTION INTRAMUSCULAR; INTRAVENOUS; SUBCUTANEOUS EVERY 30 MIN PRN
Status: DISCONTINUED | OUTPATIENT
Start: 2021-07-13 | End: 2021-07-16 | Stop reason: HOSPADM

## 2021-07-13 RX ORDER — TRANEXAMIC ACID 10 MG/ML
1 INJECTION, SOLUTION INTRAVENOUS EVERY 30 MIN PRN
Status: DISCONTINUED | OUTPATIENT
Start: 2021-07-13 | End: 2021-07-16 | Stop reason: HOSPADM

## 2021-07-13 RX ORDER — ONDANSETRON 2 MG/ML
4 INJECTION INTRAMUSCULAR; INTRAVENOUS EVERY 6 HOURS PRN
Status: DISCONTINUED | OUTPATIENT
Start: 2021-07-13 | End: 2021-07-13

## 2021-07-13 RX ORDER — NALBUPHINE HYDROCHLORIDE 10 MG/ML
2.5-5 INJECTION, SOLUTION INTRAMUSCULAR; INTRAVENOUS; SUBCUTANEOUS EVERY 6 HOURS PRN
Status: DISCONTINUED | OUTPATIENT
Start: 2021-07-13 | End: 2021-07-13

## 2021-07-13 RX ORDER — SODIUM CHLORIDE, SODIUM LACTATE, POTASSIUM CHLORIDE, CALCIUM CHLORIDE 600; 310; 30; 20 MG/100ML; MG/100ML; MG/100ML; MG/100ML
INJECTION, SOLUTION INTRAVENOUS CONTINUOUS
Status: DISCONTINUED | OUTPATIENT
Start: 2021-07-13 | End: 2021-07-13 | Stop reason: HOSPADM

## 2021-07-13 RX ORDER — MISOPROSTOL 200 UG/1
800 TABLET ORAL PRN
Status: DISCONTINUED | OUTPATIENT
Start: 2021-07-13 | End: 2021-07-16 | Stop reason: HOSPADM

## 2021-07-13 RX ORDER — AMOXICILLIN 250 MG
2 CAPSULE ORAL 2 TIMES DAILY
Status: DISCONTINUED | OUTPATIENT
Start: 2021-07-13 | End: 2021-07-16 | Stop reason: HOSPADM

## 2021-07-13 RX ORDER — CARBOPROST TROMETHAMINE 250 UG/ML
250 INJECTION, SOLUTION INTRAMUSCULAR
Status: DISCONTINUED | OUTPATIENT
Start: 2021-07-13 | End: 2021-07-16 | Stop reason: HOSPADM

## 2021-07-13 RX ORDER — OXYTOCIN/0.9 % SODIUM CHLORIDE 30/500 ML
PLASTIC BAG, INJECTION (ML) INTRAVENOUS CONTINUOUS PRN
Status: DISCONTINUED | OUTPATIENT
Start: 2021-07-13 | End: 2021-07-13

## 2021-07-13 RX ORDER — DEXTROSE, SODIUM CHLORIDE, SODIUM LACTATE, POTASSIUM CHLORIDE, AND CALCIUM CHLORIDE 5; .6; .31; .03; .02 G/100ML; G/100ML; G/100ML; G/100ML; G/100ML
INJECTION, SOLUTION INTRAVENOUS CONTINUOUS
Status: DISCONTINUED | OUTPATIENT
Start: 2021-07-13 | End: 2021-07-16 | Stop reason: HOSPADM

## 2021-07-13 RX ORDER — ACETAMINOPHEN 325 MG/1
TABLET ORAL
Status: DISCONTINUED
Start: 2021-07-13 | End: 2021-07-13 | Stop reason: HOSPADM

## 2021-07-13 RX ORDER — CITRIC ACID/SODIUM CITRATE 334-500MG
SOLUTION, ORAL ORAL
Status: COMPLETED
Start: 2021-07-13 | End: 2021-07-13

## 2021-07-13 RX ORDER — BUPIVACAINE HYDROCHLORIDE 7.5 MG/ML
INJECTION, SOLUTION INTRASPINAL PRN
Status: DISCONTINUED | OUTPATIENT
Start: 2021-07-13 | End: 2021-07-13

## 2021-07-13 RX ORDER — ONDANSETRON 2 MG/ML
4 INJECTION INTRAMUSCULAR; INTRAVENOUS EVERY 6 HOURS PRN
Status: DISCONTINUED | OUTPATIENT
Start: 2021-07-13 | End: 2021-07-16 | Stop reason: HOSPADM

## 2021-07-13 RX ORDER — KETOROLAC TROMETHAMINE 30 MG/ML
INJECTION, SOLUTION INTRAMUSCULAR; INTRAVENOUS PRN
Status: DISCONTINUED | OUTPATIENT
Start: 2021-07-13 | End: 2021-07-13

## 2021-07-13 RX ORDER — IBUPROFEN 400 MG/1
800 TABLET, FILM COATED ORAL EVERY 6 HOURS PRN
Status: DISCONTINUED | OUTPATIENT
Start: 2021-07-13 | End: 2021-07-16 | Stop reason: HOSPADM

## 2021-07-13 RX ORDER — METHYLERGONOVINE MALEATE 0.2 MG/ML
200 INJECTION INTRAVENOUS
Status: DISCONTINUED | OUTPATIENT
Start: 2021-07-13 | End: 2021-07-16 | Stop reason: HOSPADM

## 2021-07-13 RX ORDER — METOCLOPRAMIDE 10 MG/1
10 TABLET ORAL EVERY 6 HOURS PRN
Status: DISCONTINUED | OUTPATIENT
Start: 2021-07-13 | End: 2021-07-16 | Stop reason: HOSPADM

## 2021-07-13 RX ORDER — ONDANSETRON 4 MG/1
4 TABLET, ORALLY DISINTEGRATING ORAL EVERY 6 HOURS PRN
Status: DISCONTINUED | OUTPATIENT
Start: 2021-07-13 | End: 2021-07-13

## 2021-07-13 RX ORDER — OXYTOCIN/0.9 % SODIUM CHLORIDE 30/500 ML
340 PLASTIC BAG, INJECTION (ML) INTRAVENOUS CONTINUOUS PRN
Status: DISCONTINUED | OUTPATIENT
Start: 2021-07-13 | End: 2021-07-16 | Stop reason: HOSPADM

## 2021-07-13 RX ADMIN — SODIUM CHLORIDE, POTASSIUM CHLORIDE, SODIUM LACTATE AND CALCIUM CHLORIDE: 600; 310; 30; 20 INJECTION, SOLUTION INTRAVENOUS at 09:05

## 2021-07-13 RX ADMIN — OXYCODONE HYDROCHLORIDE 5 MG: 5 TABLET ORAL at 19:44

## 2021-07-13 RX ADMIN — MORPHINE SULFATE 15 MCG: 1 INJECTION, SOLUTION EPIDURAL; INTRATHECAL; INTRAVENOUS at 09:20

## 2021-07-13 RX ADMIN — PHENYLEPHRINE HYDROCHLORIDE 0.5 MCG/KG/MIN: 10 INJECTION INTRAVENOUS at 09:20

## 2021-07-13 RX ADMIN — CEFAZOLIN SODIUM 2 G: 2 INJECTION, SOLUTION INTRAVENOUS at 09:25

## 2021-07-13 RX ADMIN — KETOROLAC TROMETHAMINE 30 MG: 30 INJECTION, SOLUTION INTRAMUSCULAR at 10:06

## 2021-07-13 RX ADMIN — SENNOSIDES AND DOCUSATE SODIUM 1 TABLET: 8.6; 5 TABLET ORAL at 19:44

## 2021-07-13 RX ADMIN — SODIUM CITRATE AND CITRIC ACID MONOHYDRATE 30 ML: 500; 334 SOLUTION ORAL at 09:04

## 2021-07-13 RX ADMIN — ACETAMINOPHEN 975 MG: 325 TABLET, FILM COATED ORAL at 09:07

## 2021-07-13 RX ADMIN — Medication 340 ML/HR: at 09:37

## 2021-07-13 RX ADMIN — ACETAMINOPHEN 975 MG: 325 TABLET, FILM COATED ORAL at 21:03

## 2021-07-13 RX ADMIN — KETOROLAC TROMETHAMINE 30 MG: 30 INJECTION, SOLUTION INTRAMUSCULAR; INTRAVENOUS at 21:59

## 2021-07-13 RX ADMIN — FENTANYL CITRATE 15 MCG: 50 INJECTION, SOLUTION INTRAMUSCULAR; INTRAVENOUS at 09:20

## 2021-07-13 RX ADMIN — ONDANSETRON 4 MG: 2 INJECTION INTRAMUSCULAR; INTRAVENOUS at 09:39

## 2021-07-13 RX ADMIN — SODIUM CHLORIDE, SODIUM LACTATE, POTASSIUM CHLORIDE, CALCIUM CHLORIDE AND DEXTROSE MONOHYDRATE: 5; 600; 310; 30; 20 INJECTION, SOLUTION INTRAVENOUS at 18:47

## 2021-07-13 RX ADMIN — BUPIVACAINE HYDROCHLORIDE IN DEXTROSE 1.4 ML: 7.5 INJECTION, SOLUTION SUBARACHNOID at 09:20

## 2021-07-13 RX ADMIN — ACETAMINOPHEN 975 MG: 325 TABLET, FILM COATED ORAL at 14:45

## 2021-07-13 RX ADMIN — KETOROLAC TROMETHAMINE 30 MG: 30 INJECTION, SOLUTION INTRAMUSCULAR; INTRAVENOUS at 15:49

## 2021-07-13 ASSESSMENT — ENCOUNTER SYMPTOMS: SEIZURES: 0

## 2021-07-13 ASSESSMENT — LIFESTYLE VARIABLES: TOBACCO_USE: 0

## 2021-07-13 NOTE — ANESTHESIA CARE TRANSFER NOTE
I tried calling the patient again, but was busy; The number I was trying has a note that it is the daughter's cell, but I noted another option; I called and was able to reach Michelle; She will bring Iraj to the [facility] in [place] to have his lab work done; She would like him to infuse at [facility]; I will send a PA request to our infusion finance team; O[employer] start form has been faxed to [employer].    Nila Salinas, MS RN Care Coordinator     Patient: Teri Mendez    Procedure(s):  REPEAT  SECTION    Diagnosis: Previous  section [Z98.891]  Diagnosis Additional Information: No value filed.    Anesthesia Type:   Spinal     Note:    Oropharynx: oropharynx clear of all foreign objects and spontaneously breathing  Level of Consciousness: awake  Oxygen Supplementation: room air    Independent Airway: airway patency satisfactory and stable  Dentition: dentition unchanged  Vital Signs Stable: post-procedure vital signs reviewed and stable  Report to RN Given: handoff report given  Patient transferred to: Phase II (OB phase II)    Handoff Report: Identifed the Patient, Identified the Reponsible Provider, Reviewed the pertinent medical history, Discussed the surgical course, Reviewed Intra-OP anesthesia mangement and issues during anesthesia, Set expectations for post-procedure period and Allowed opportunity for questions and acknowledgement of understanding      Vitals: (Last set prior to Anesthesia Care Transfer)  CRNA VITALS  2021 0941 - 2021 1024      2021             Resp Rate (set):  10        Electronically Signed By: GAYE Luke CRNA  2021  10:24 AM

## 2021-07-13 NOTE — ANESTHESIA POSTPROCEDURE EVALUATION
Patient: Teri Mendez    Procedure(s):  REPEAT  SECTION    Diagnosis:Previous  section [Z98.891]  Diagnosis Additional Information: No value filed.    Anesthesia Type:  Spinal    Note:  Disposition: Admission   Postop Pain Control: Uneventful            Sign Out: Well controlled pain   PONV: No   Neuro/Psych: Uneventful            Sign Out: Acceptable/Baseline neuro status   Airway/Respiratory: Uneventful            Sign Out: Acceptable/Baseline resp. status   CV/Hemodynamics: Uneventful            Sign Out: Acceptable CV status; No obvious hypovolemia; No obvious fluid overload   Other NRE: NONE   DID A NON-ROUTINE EVENT OCCUR? No           Last vitals:  Vitals:    21 1130 21 1215 21 1245   BP: 113/57 115/70 110/71   Pulse: 76 64 74   Resp: 9 14 14   Temp:      SpO2: 97% 99% 99%       Last vitals prior to Anesthesia Care Transfer:  CRNA VITALS  2021 0941 - 2021 1041      2021             Resp Rate (set):  10          Electronically Signed By: Rei Hutson MD  2021  1:29 PM

## 2021-07-13 NOTE — PLAN OF CARE
Patient arrived to the unit with baby in arms and  by her side. They were oriented to the room and safety education was completed. Both verbalized understanding. Continuing to monitor.

## 2021-07-13 NOTE — ANESTHESIA PREPROCEDURE EVALUATION
Anesthesia Pre-Procedure Evaluation    Patient: Teri Mendez   MRN: [id number] : 1985        Preoperative Diagnosis: Previous  section [Z98.891]   Procedure : Procedure(s):  REPEAT  SECTION     Past Medical History:   Diagnosis Date     Cervical cancer (H) 2016     PONV (postoperative nausea and vomiting)       Past Surgical History:   Procedure Laterality Date      SECTION N/A 2017    Procedure:  SECTION;;  Surgeon: Jazmin Hogue MD;  Location:  L+D     DILATION AND CURETTAGE SUCTION N/A 2016    Procedure: DILATION AND CURETTAGE SUCTION;  Surgeon: Marilou iKmble MD;  Location: [facility]     ORTHOPEDIC SURGERY        No Known Allergies   Social History     Tobacco Use     Smoking status: Never Smoker     Smokeless tobacco: Never Used   Substance Use Topics     Alcohol use: Not Currently     Comment: 5/week      Wt Readings from Last 1 Encounters:   17 64.4 kg (142 lb)        Anesthesia Evaluation   Pt has had prior anesthetic.     History of anesthetic complications  - PONV.      ROS/MED HX  ENT/Pulmonary:    (-) tobacco use and sleep apnea   Neurologic:    (-) no seizures and no CVA   Cardiovascular:    (-) hypertension   METS/Exercise Tolerance:     Hematologic:       Musculoskeletal:       GI/Hepatic:    (-) GERD and liver disease   Renal/Genitourinary:    (-) renal disease   Endo:    (-) Type II DM and thyroid disease   Psychiatric/Substance Use:       Infectious Disease:       Malignancy:       Other:            Physical Exam    Airway        Mallampati: II   TM distance: > 3 FB   Neck ROM: full   Mouth opening: > 3 cm    Respiratory Devices and Support         Dental  no notable dental history         Cardiovascular   cardiovascular exam normal          Pulmonary   pulmonary exam normal                OUTSIDE LABS:  CBC:   Lab Results   Component Value Date    WBC 9.2 2017    HGB 11.8 2021    HGB 9.0 (L) 2017    HCT  33.4 (L) 09/20/2017     09/20/2017     BMP: No results found for: NA, POTASSIUM, CHLORIDE, CO2, BUN, CR, GLC  COAGS:   Lab Results   Component Value Date    PTT 25 09/20/2017    INR 0.87 09/20/2017     POC: No results found for: BGM, HCG, HCGS  HEPATIC:   Lab Results   Component Value Date    ALBUMIN 2.0 (L) 09/20/2017    PROTTOTAL 6.1 (L) 09/20/2017     (H) 09/20/2017    AST 91 (H) 09/20/2017    ALKPHOS 261 (H) 09/20/2017    BILITOTAL 0.3 09/20/2017     OTHER: No results found for: PH, LACT, A1C, VILMA, PHOS, MAG, LIPASE, AMYLASE, TSH, T4, T3, CRP, SED    Anesthesia Plan    ASA Status:  2   NPO Status:  NPO Appropriate    Anesthesia Type: Spinal.              Consents    Anesthesia Plan(s) and associated risks, benefits, and realistic alternatives discussed. Questions answered and patient/representative(s) expressed understanding.     - Discussed with:  Patient         Postoperative Care    Pain management: Multi-modal analgesia.   PONV prophylaxis: Ondansetron (or other 5HT-3), Scopolamine patch     Comments:                Rei Hutson MD

## 2021-07-13 NOTE — PLAN OF CARE
Vital signs stable. Lung sounds clear and equal. Using Tylenol/Toradol for pain management. Up ambulating free of dizziness. Justice catheter removed at 1230, she has been up to try to void x2 but has been unsuccessful. Bladder scanned her for 262. Patient encouraged to drink more water. D5LR was just hung after Pitocin being done.  Working on breastfeeding every 2-3 hours. Encouraged to call with questions/concerns. Will continue to monitor.

## 2021-07-13 NOTE — ANESTHESIA PROCEDURE NOTES
Intrathecal Procedure Note  Pre-Procedure   Staff -        Anesthesiologist:  Rei Hutson MD       Performed By: anesthesiologist       Location: OB, OR       Pre-Anesthestic Checklist: patient identified, IV checked, site marked, risks and benefits discussed, informed consent, monitors and equipment checked, pre-op evaluation, at physician/surgeon's request and post-op pain management  Timeout:       Correct Patient: Yes        Correct Procedure: Yes        Correct Site: Yes        Correct Position: Yes   Procedure Documentation  Procedure: intrathecal       Patient Position: sitting       Patient Prep/Sterile Barriers: sterile gloves, mask, patient draped       Skin prep: Betadine       Insertion Site: L3-4. (midline approach).       Needle Gauge: 24.        Needle Length (Inches): 4        Spinal Needle Type: Norbert tip       Introducer used      # of attempts: 1 and  # of redirects:     Assessment/Narrative         Paresthesias: No.       CSF fluid: clear.    Comments:  No complications

## 2021-07-13 NOTE — OP NOTE
Procedure Date: 2021    PREOPERATIVE DIAGNOSES:  1.  Intrauterine pregnancy at 38 and 3/7 weeks.  2.  Advanced maternal age.  3.  History of prior  section.  4.  Cholestasis of pregnancy in the third trimester.    POSTOPERATIVE DIAGNOSES:    1.  Intrauterine pregnancy at 38 and 3/7 weeks.  2.  Advanced maternal age.  3.  History of prior  section.  4.  Cholestasis of pregnancy in the third trimester.  5.  Delivery.    PROCEDURE:  Repeat  section.    SURGEON:  Mark Victor MD    ASSISTANT:  None.    ANESTHESIA:  Spinal.    INTRAVENOUS FLUIDS:  300 mL.    COMPLICATIONS:  None.    SPECIMENS REMOVED:  Cord blood.    FINDINGS:  The patient delivered a viable female infant weighing 8 pounds 8 ounces in ROT position.  There was a tight cord around the neck and I had to use a Mityvac to pull the rounded infant's head through the stiffer abdominal wall secondary to prior scarring.  Apgars were healthy.    DESCRIPTION OF PROCEDURE:  After obtaining informed consent, the patient was prepped and draped in the usual manner for abdominal procedure.  A scalpel was used to recreate a Pfannenstiel skin incision.  This dissection was carried through to the level of the fascia with electrocautery.  Fascia was nicked undermined with Reed scissors and extended bilaterally.  Rectus fascia was  from the rectus musculature superiorly and inferiorly.  The rectus musculature was sharply divided in the midline and the parietal peritoneum was entered bluntly.  The incision was extended.  A bladder blade was placed and created a bladder flap with Metzenbaum scissors.  The bladder blade was then replaced and we used a scalpel to enter the uterine cavity.  Clear fluid was noted.  The incision was bluntly extended and we attempted to deliver with fundal pressure.  We had trouble getting it through the rectus aperture, so I placed a Mityvac and kept the pressure in the normal green interval as noted on the  dial and extracted the fetal head without incident.  There was a tight cord around the neck.  The rest of the infant delivered through the cord.  At this point, we did a 1 minute delayed cord clamping.  Mouth and nares were bulb suctioned.  The cord was clamped and cut and the infant was handed to waiting pediatric staff.  The uterus was cleared of placenta clots and debris.  The incision was closed with 0 Vicryl in a running locking stitch.  We added a figure-of-eight of 0 Vicryl at the right aspect.  Copious irrigation and suction occurred finding no further bleeding.  We closed the parietal peritoneum with 3-0 Vicryl in a running stitch.  Fascia was closed with 0 Vicryl in a running stitch.  The subcutaneous adipose was reapproximated with 2-0 plain in a running stitch.  Skin was closed with 4-0 Vicryl in a subcuticular stitch and the wound was sealed with Exofin.  Needle, sponge, and instrument counts were correct.  The patient tolerated the procedure well.    DISPOSITION:  The patient is in satisfactory stable condition and is in recovery.    Mark Victor MD        D: 2021   T: 2021   MT: glo    Name:     RIDDHI MIRZA  MRN:      [id number]        Account:        [id number]   :      1985           Procedure Date: 2021     Document: [id number]

## 2021-07-13 NOTE — H&P
[facility] Labor and Delivery History and Physical    Teri Mendez MRN# [id number]   Age: 35 year old YOB: 1985     Date of Admission:  2021    Primary care provider: Jazmin Hogue           Chief Complaint:   Teri Mendez is a 35 year old female who is 38w3d pregnant and being admitted for .    AMA female here for repeat LTCS at 38/3 weeks due to cholestasis diagnosis. Female, Normal prenatal genetic screening. O+ blood type. No other concerns with  course.          Pregnancy history:     OBSTETRIC HISTORY:    OB History    Para Term  AB Living   2 1 0 1 0 1   SAB TAB Ectopic Multiple Live Births   0 0 0 0 1      # Outcome Date GA Lbr Dany/2nd Weight Sex Delivery Anes PTL Lv   2 Current            1  17 36w5d  3.2 kg (7 lb 0.9 oz) M  Spinal  TUCKER      Name: BEVERLY MENDEZ      Apgar1: 8  Apgar5: 9       EDC: Estimated Date of Delivery: 2021    Prenatal Labs:   Lab Results   Component Value Date    ABO O 2017    RH Pos 2017    AS Neg 2017    HEPBANG non-reactive 03/10/2017    TREPAB Negative 2017    HGB 11.8 2021       GBS Status:   Lab Results   Component Value Date    GBS neg 2017       Active Problem List  Patient Active Problem List   Diagnosis     Indication for care in labor or delivery     S/P primary low transverse        Medication Prior to Admission  Medications Prior to Admission   Medication Sig Dispense Refill Last Dose     doxylamine (UNISOM) 25 MG TABS tablet Take 25 mg by mouth nightly as needed   2021 at Unknown time     Prenatal Vit-DSS-Fe Fum-FA (PNV FE FUM/DOCUSATE/FOLIC ACID) 29-1 MG TABS    2021 at Unknown time   .        Maternal Past Medical History:     Past Medical History:   Diagnosis Date     Cervical cancer (H) 2016     PONV (postoperative nausea and vomiting)                        Family History:   History reviewed. No  pertinent family history.  Family history reviewed and updated in [facility]            Social History:     Social History     Tobacco Use     Smoking status: Never Smoker     Smokeless tobacco: Never Used   Substance Use Topics     Alcohol use: Not Currently     Comment: 5/week            Review of Systems:   C: NEGATIVE for fever, chills, change in weight  E/M: NEGATIVE for ear, mouth and throat problems  R: NEGATIVE for significant cough or SOB  CV: NEGATIVE for chest pain, palpitations or peripheral edema          Physical Exam:   Vitals were reviewed    Constitutional: Awake, alert, cooperative, no apparent distress, and appears stated age.  Eyes: Lids and lashes normal, pupils equal, round and reactive to light, extra ocular muscles intact, sclera clear, conjunctiva normal.  ENT: Normocephalic, without obvious abnormality, atramatic, sinuses nontender on palpation, external ears without lesions, oral pharynx with moist mucus membranes, tonsils without erythema or exudates, gums normal and good dentition.  Neck: Supple, symmetrical, trachea midline, no adenopathy, thyroid symmetric, not enlarged and no tenderness, skin normal.  Hematologic / Lymphatic: No cervical lymphadenopathy and no supraclavicular lymphadenopathy.  Back: Symmetric, no curvature, spinous processes are non-tender on palpation, paraspinous muscles are non-tender on palpation, no costal vertebral tenderness.  Lungs: No increased work of breathing, good air exchange, clear to auscultation bilaterally, no crackles or wheezing.  Cardiovascular: Regular rate and rhythm, normal S1 and S2, no S3 or S4, and no murmur noted.  Chest / Breast: Breasts symmetrical, skin without lesion(s), no nipple retraction or dimpling, no nipple discharge, no masses palpated, no axillary or supraclavicular adenopathy.  Abdomen: No scars, normal bowel sounds, soft, non-distended, non-tender, no masses palpated, no hepatosplenomegally.  Genitourinary: No urethral discharge,  normal external genitalia, no hernia.  Musculoskeletal: No redness, warmth, or swelling of the joints.  Full range of motion noted.  Motor strength is 5 out of 5 all extremities bilaterally.  Tone is normal.  Neurologic: Awake, alert, oriented to name, place and time.  Cranial nerves II-XII are grossly intact.  Motor is 5 out of 5 bilaterally.  Cerebellar finger to nose, heel to shin intact.  Sensory is intact.  Babinski down going, Romberg negative, and gait is normal.  Neuropsychiatric: Normal affect, mood, orientation, memory and insight.  Skin: No rashes, erythema, pallor, petechia or purpura.     Cervix:   Membranes: intact     Presentation:Cephalic  Fetal Heart Rate Tracing: reactive and reassuring  Tocometer: external monitor                       Assessment:   Teri Mendez is a 38w3d pregnant female admitted with   AMA  Cholestasis at 38/3 weeks  H/o LTCS.          Plan:   Admit - see IP orders  Prepare for  section    Mark Victor MD

## 2021-07-13 NOTE — BRIEF OP NOTE
[facility] Brief Operative Note    Pre-operative diagnosis: Previous  section [Z98.891]   Post-operative diagnosis AMA at 38/3, /o LTCS, Cholestasis of pregnancy     Procedure: Procedure(s):  REPEAT  SECTION   Surgeon(s): Surgeon(s) and Role:     * Jazmin Hogue MD - Primary     * Mark Victor MD   Estimated blood loss: * 300 ml QBL 2021  9:30 AM and 2021 10:03 AM *    Specimens: * No specimens in log *   Findings: Viable female 8#8 oz. ROT. CANx 1 tight, MItyVac used to effect delivery through stiffened scar of uterus and abd. Wall. APgars healthy

## 2021-07-14 LAB — HGB BLD-MCNC: 11.4 G/DL (ref 11.7–15.7)

## 2021-07-14 PROCEDURE — 250N000013 HC RX MED GY IP 250 OP 250 PS 637: Performed by: OBSTETRICS & GYNECOLOGY

## 2021-07-14 PROCEDURE — 120N000012 HC R&B POSTPARTUM

## 2021-07-14 PROCEDURE — 250N000011 HC RX IP 250 OP 636: Performed by: OBSTETRICS & GYNECOLOGY

## 2021-07-14 PROCEDURE — 85018 HEMOGLOBIN: CPT | Performed by: OBSTETRICS & GYNECOLOGY

## 2021-07-14 PROCEDURE — 36415 COLL VENOUS BLD VENIPUNCTURE: CPT | Performed by: OBSTETRICS & GYNECOLOGY

## 2021-07-14 RX ADMIN — IBUPROFEN 800 MG: 400 TABLET ORAL at 16:42

## 2021-07-14 RX ADMIN — SENNOSIDES AND DOCUSATE SODIUM 1 TABLET: 8.6; 5 TABLET ORAL at 20:08

## 2021-07-14 RX ADMIN — OXYCODONE HYDROCHLORIDE 5 MG: 5 TABLET ORAL at 05:20

## 2021-07-14 RX ADMIN — ACETAMINOPHEN 975 MG: 325 TABLET, FILM COATED ORAL at 02:41

## 2021-07-14 RX ADMIN — SIMETHICONE 80 MG: 80 TABLET, CHEWABLE ORAL at 12:27

## 2021-07-14 RX ADMIN — SENNOSIDES AND DOCUSATE SODIUM 1 TABLET: 8.6; 5 TABLET ORAL at 08:19

## 2021-07-14 RX ADMIN — ACETAMINOPHEN 975 MG: 325 TABLET, FILM COATED ORAL at 14:49

## 2021-07-14 RX ADMIN — OXYCODONE HYDROCHLORIDE 5 MG: 5 TABLET ORAL at 09:51

## 2021-07-14 RX ADMIN — SIMETHICONE 80 MG: 80 TABLET, CHEWABLE ORAL at 19:14

## 2021-07-14 RX ADMIN — ACETAMINOPHEN 975 MG: 325 TABLET, FILM COATED ORAL at 08:48

## 2021-07-14 RX ADMIN — OXYCODONE HYDROCHLORIDE 5 MG: 5 TABLET ORAL at 19:14

## 2021-07-14 RX ADMIN — OXYCODONE HYDROCHLORIDE 5 MG: 5 TABLET ORAL at 23:15

## 2021-07-14 RX ADMIN — OXYCODONE HYDROCHLORIDE 5 MG: 5 TABLET ORAL at 14:49

## 2021-07-14 RX ADMIN — IBUPROFEN 800 MG: 400 TABLET ORAL at 23:15

## 2021-07-14 RX ADMIN — KETOROLAC TROMETHAMINE 30 MG: 30 INJECTION, SOLUTION INTRAMUSCULAR; INTRAVENOUS at 10:20

## 2021-07-14 RX ADMIN — KETOROLAC TROMETHAMINE 30 MG: 30 INJECTION, SOLUTION INTRAMUSCULAR; INTRAVENOUS at 04:24

## 2021-07-14 RX ADMIN — OXYCODONE HYDROCHLORIDE 5 MG: 5 TABLET ORAL at 00:39

## 2021-07-14 RX ADMIN — SIMETHICONE 80 MG: 80 TABLET, CHEWABLE ORAL at 08:48

## 2021-07-14 RX ADMIN — ACETAMINOPHEN 975 MG: 325 TABLET, FILM COATED ORAL at 21:15

## 2021-07-14 NOTE — LACTATION NOTE
"Initial visit with LINA Williamson, and baby girl. Infant was breastfeeding in football hold on R breast at time of visit. Nutritive suckling pattern observed. Teri shares breastfeeding has been going well so far. Infant is LGA, initial OT complete.      Highlighted breast feeding section in our \"Guide to Postpartum and  Care.\" Emphasized importance of skin to skin for enhancing early breastfeeding success!   Discussed  breastfeeding basics:   1) Watch for early feeding cues (licking lips, stirring or rooting, sucking movement with mouth, hands to mouth)  2) Feed infant on demand, a minimum of 8 times in 24 hours (recommended waking infant if it's been 3 hours since last feeding)  3) Techniques to waking a sleepy baby to nurse: (undress infant, change diaper if necessary, gently stroking bottom of feet and back, snuggling infant skin to skin, expressing colostrum).      Reviewed \"typical\"  feeding patterns/behavior: Day 1 sleepiness (birthday nap) through cluster-feeding on day (night) 2. Educated on nutritive vs non-nutritive suckling patterns. Discussed normal infant weight loss and when infant should be back to birth weight.     Provided lanolin. Appreciative of visit.    Joelle Rosa RN, IBCLC            "

## 2021-07-14 NOTE — PLAN OF CARE
Pt's pain controlled with Oxycodone/Ibuprofen/Tylenol. Up and about in room and hallways. Abdominal binder on prn. Pumping breasts started this afternoon due to donor milk usage. Will continue to monitor.

## 2021-07-14 NOTE — LACTATION NOTE
Follow up Lactation visit with Teri & baby girl Summer. Teri reports feeding is going well, but does share that baby had a low glucose today at 24 hours, started supplementing with donor milk after feedings. Per peds, should continue to supplement with feedings until assessment in the AM. Teri also started pumping after supplementation started. Discussed rationale for pumping, for stimulation and to help bring milk in since supplementing. Reassured it's expected to only see drops of colostrum when pumping at this time. Teri appreciative of explanation. Also reviewed initiate mode on Medela pump, encouraged continuing with initiate mode while inpatient.    Reviewed milk supply and engorgement. Breastfeeding section reviewed in Your Guide to Postpartum & [place] Care. Discussed typical  feeding patterns, cluster feeding, and ways to wake a sleepy baby for feedings. Teri asked questions regarding pacifier, discussed pacifier use and general recommendation to wait to start pacifier until breastfeeding is well established.    Feeding plan: Recommend unlimited, frequent breast feedings: At least 8 - 12 times every 24 hours. Supplement with EBM/donor milk with each feeding. Teri to pump with each feeding. Avoid pacifiers and supplementation with formula unless medically indicated. Encouraged use of feeding log and to record feedings, and void/stool patterns. Teri has a pump for home use.  Encouraged to call with needs, will revisit as needed. Teri very appreciative of visit.    Christine Parada, RN-C, IBCLC, MNN, PHN, BSN

## 2021-07-14 NOTE — PLAN OF CARE
Vital signs stable. Postpartum assessment WDL. Incision open to air with liq bandage. Pain controlled with tylenol, ibuprofen and 5 mg oxycodone when due.  Pt is also taking gas X for gas pains as well as ambulating outside of room. Patient ambulating indep. Patient reports passing gas. Breastfeeding on cue with no assist. Pt is also pumping and giving baby EBM/DM via bottle.  Patient and infant bonding well. Will continue with current plan of care.

## 2021-07-14 NOTE — PLAN OF CARE
Vital signs stable. Postpartum assessment WDL. Uterine fundus is firm and midline. Scant flow. Using Tylenol, Tordal, and PRN Oxy, for pain with good relief. Incision assessment WDL. Ambulating; free of dizziness. Urine output adequate. IV saline locked. Breastfeeding well. Will continue to monitor. Questions/concerns addressed.

## 2021-07-15 PROCEDURE — 250N000013 HC RX MED GY IP 250 OP 250 PS 637: Performed by: OBSTETRICS & GYNECOLOGY

## 2021-07-15 PROCEDURE — 120N000012 HC R&B POSTPARTUM

## 2021-07-15 RX ADMIN — IBUPROFEN 800 MG: 400 TABLET ORAL at 05:14

## 2021-07-15 RX ADMIN — ACETAMINOPHEN 975 MG: 325 TABLET, FILM COATED ORAL at 03:38

## 2021-07-15 RX ADMIN — SENNOSIDES AND DOCUSATE SODIUM 2 TABLET: 8.6; 5 TABLET ORAL at 21:09

## 2021-07-15 RX ADMIN — OXYCODONE HYDROCHLORIDE 5 MG: 5 TABLET ORAL at 14:22

## 2021-07-15 RX ADMIN — ACETAMINOPHEN 975 MG: 325 TABLET, FILM COATED ORAL at 21:09

## 2021-07-15 RX ADMIN — OXYCODONE HYDROCHLORIDE 5 MG: 5 TABLET ORAL at 09:27

## 2021-07-15 RX ADMIN — OXYCODONE HYDROCHLORIDE 5 MG: 5 TABLET ORAL at 03:38

## 2021-07-15 RX ADMIN — IBUPROFEN 800 MG: 400 TABLET ORAL at 18:07

## 2021-07-15 RX ADMIN — SENNOSIDES AND DOCUSATE SODIUM 1 TABLET: 8.6; 5 TABLET ORAL at 08:20

## 2021-07-15 RX ADMIN — IBUPROFEN 800 MG: 400 TABLET ORAL at 23:58

## 2021-07-15 RX ADMIN — ACETAMINOPHEN 975 MG: 325 TABLET, FILM COATED ORAL at 15:51

## 2021-07-15 RX ADMIN — ACETAMINOPHEN 975 MG: 325 TABLET, FILM COATED ORAL at 09:27

## 2021-07-15 RX ADMIN — IBUPROFEN 800 MG: 400 TABLET ORAL at 11:05

## 2021-07-15 NOTE — PLAN OF CARE
Pt's pain controlled with Oxycodone/Ibuprofen/Tylenol. Up and about in room and the hallways. Abdominal binder on. Pumping after feedings. Will continue to monitor.

## 2021-07-15 NOTE — PROGRESS NOTES
S: Patient doing well with no overnight issues and no current complaints.  Pain is well controlled.  Tolerating PO intake.  Breast feeding without issues.  Ambulating without difficulty.  Voiding and passing flatus.  Lochia is less than normal menses and decreasing.  Denies fevers, headaches, changes in vision, chest pain, SOB, nausea, vomiting, diarrhea, constipation, RUQ tenderness, dysuria, or LE pain.    O:   Vitals:    07/14/21 1642 07/14/21 2200 07/14/21 2315 07/15/21 0718   BP: 114/73 120/77  130/84   BP Location:    Right arm   Pulse: 69 68  88   Resp:  16 16 16   Temp: 98.5  F (36.9  C) 98.5  F (36.9  C)  98  F (36.7  C)   TempSrc: Oral Oral  Oral   SpO2:         NAD, AAOx3, RRR, CTAB, ABd soft NTND, Firm fundus 1cm below the umbilicus, LE NT no edema, Incision C/D/I    A: s/PPD#2 LTCS who is recovering well.  P: Routine PP care.  Plan for discharge tomorrow.  Hb 11.4

## 2021-07-15 NOTE — PROGRESS NOTES
Patient states she will decline the oxycodone because it has been making her feel  Foggy. She wants to take take Ibuprofen and Tylenol for pain control.

## 2021-07-15 NOTE — PROGRESS NOTES
S: Doing well no issues no complaints.  Notes minimal breast milk, no hind milk yet.  Pain controlled on medications.  Meeting PP milestones  O: VSS  NAD, standing bedside ambulating.  Incision C.D.I, abn distended nontender  LE nontender no edema  A: s/PPD#2 LTCS who is recovering well.  P: Routine PP care.  Plan for discharge tomorrow.  Hb 11.4

## 2021-07-15 NOTE — PLAN OF CARE
Vital signs stable. Postpartum assessment WDL. Incision clean dry and intact open to air small bruise seen on sides of  incision . Pain controlled with tylenol,motrin and oxycodone  Patient up ambulating voiding without difficulty  Patient passing gas.had a bowel movement working on breastfeeding and pumping Patient and infant bonding well. Will continue with current plan of care.

## 2021-07-15 NOTE — LACTATION NOTE
Follow up Lactation visit with Teri, her mother, & baby elliott Perez. Teri reports feeding is going ok, but shared her right nipple has a reddened stripe on tip and both nipples are sore. At time of visit, baby latched and feeding at left breast in cradle hold. LC checked latch and rolled lower lip down and out for a shallow latch. Encouraged to use Mother's love cream she brought from home after each feeding. Also gave sore nipple shells. Encouraged to work on getting deep, comfortable latches with each feed. When alisha Perez came off breast, noted left nipple was slightly compressed at tip. Teri relatched her on right side and LC again rolled lower lip down and out. Encouraged to call for latch check as needed.    Alisha Perez has been supplementing with EBM/donor milk after feeds. Teri is pumping. Will continue to supplement until milk supply transitions. Encouraged to keep pumping with each feeding.  Breastfeeding section reviewed in Your Guide to Postpartum & [place] Care. Discussed typical  feeding patterns, cluster feeding, and ways to wake a sleepy baby for feedings.    Feeding plan: Recommend unlimited, frequent breast feedings: At least 8 - 12 times every 24 hours. Supplement with EBM/donor milk after each feed and Teri to pump. Avoid pacifiers and supplementation with formula unless medically indicated. Encouraged use of feeding log and to record feedings, and void/stool patterns. Teri has a pump for home use.  Encouraged to call with needs, will revisit as needed. Teri very appreciative of Lactation visit.    Christine Parada, RN-C, IBCLC, MNN, PHN, BSN

## 2021-07-16 VITALS
OXYGEN SATURATION: 98 % | DIASTOLIC BLOOD PRESSURE: 81 MMHG | RESPIRATION RATE: 16 BRPM | HEART RATE: 73 BPM | SYSTOLIC BLOOD PRESSURE: 132 MMHG | TEMPERATURE: 99.1 F

## 2021-07-16 PROCEDURE — 250N000013 HC RX MED GY IP 250 OP 250 PS 637: Performed by: OBSTETRICS & GYNECOLOGY

## 2021-07-16 RX ORDER — ACETAMINOPHEN 325 MG/1
650 TABLET ORAL EVERY 4 HOURS PRN
COMMUNITY
Start: 2021-07-16

## 2021-07-16 RX ORDER — IBUPROFEN 800 MG/1
800 TABLET, FILM COATED ORAL EVERY 6 HOURS PRN
Qty: 60 TABLET | Refills: 1 | Status: SHIPPED | OUTPATIENT
Start: 2021-07-16

## 2021-07-16 RX ORDER — OXYCODONE HYDROCHLORIDE 5 MG/1
5 TABLET ORAL EVERY 4 HOURS PRN
Qty: 12 TABLET | Refills: 0 | Status: ON HOLD | OUTPATIENT
Start: 2021-07-16 | End: 2022-11-21

## 2021-07-16 RX ADMIN — ACETAMINOPHEN 975 MG: 325 TABLET, FILM COATED ORAL at 03:18

## 2021-07-16 RX ADMIN — SENNOSIDES AND DOCUSATE SODIUM 2 TABLET: 8.6; 5 TABLET ORAL at 09:11

## 2021-07-16 RX ADMIN — IBUPROFEN 800 MG: 400 TABLET ORAL at 06:12

## 2021-07-16 RX ADMIN — ACETAMINOPHEN 975 MG: 325 TABLET, FILM COATED ORAL at 09:05

## 2021-07-16 NOTE — PROGRESS NOTES
S: Patient doing well with no overnight issues and no current complaints.  Pain is well controlled.  Tolerating PO intake.  Breast feeding without issues.  Ambulating without difficulty.  Voiding and passing flatus.  Lochia is less than normal menses and decreasing.  Denies fevers, headaches, changes in vision, chest pain, SOB, nausea, vomiting, diarrhea, constipation, RUQ tenderness, dysuria, or LE pain.    O:   Vitals:    07/15/21 1551 07/15/21 1807 07/15/21 2358 07/16/21 0612   BP:   118/80    BP Location:       Pulse: 75  77    Resp: 16 16 16 16   Temp: 98.5  F (36.9  C)  98.5  F (36.9  C)    TempSrc: Oral  Oral    SpO2:         NAD, AAOx3, RRR, CTAB, ABd soft NTND, Firm fundus 1cm below the umbilicus, LE NT no edema, Incision C/D/I    A: s/PPD#3 LTCS who is recovering well.  P: Routine PP care.  Plan for discharge today.  Hb 11.4

## 2021-07-16 NOTE — PLAN OF CARE
D: VSS, assessments WDL.   I: Pt. received complete discharge paperwork and home medications as filled by discharge pharmacy.  Pt. was given times of last dose for all discharge medications in writing on discharge medication sheets.  Discharge teaching included home medication, pain management, activity restrictions, postpartum cares, and signs and symptoms of infection.    A: Discharge outcomes on care plan met.  Mother states understanding and comfort with self and baby cares.  P: Pt. discharged to home.  Pt. was discharged with baby, and bands were checked at time of discharge.  Pt. was accompanied by , nurse and baby, and left with personal belongings.  .  Pt. to follow up with OB per MD order.  Pt. had no further questions at the time of discharge and no unmet needs were identified.

## 2021-07-16 NOTE — DISCHARGE INSTRUCTIONS

## 2021-07-16 NOTE — PLAN OF CARE
Pt's pain controlled with Ibuprofen and Tylenol. Using ice on incision for comfort and behind her neck for a head ache. Up and  in room . Abdominal binder on. Pumping after feedings. Will continue to monitor.  in the room helping with baby cares.

## 2021-07-16 NOTE — LACTATION NOTE
Routine visit with Teri, LINA and baby girl Summer.  Baby latched on well to the right breast.  Mother reports milk in breasts are firmer.  Getting ready for discharge.  Plan: Watch for feeding cues and feed every 2-3 hours and/or on demand. Continue to use feeding log to track intake and appropriate voids and stools. Take feeding log to first follow up appointment or weight check. Encourage skin to skin to promote frequent feedings, thermoregulation and bonding. Follow-up with healthcare provider or lactation consultant for questions or concerns.     Instructed on signs/symptoms of engorgement/ plugged ducts and mastitis.  Instructed on comfort measures and when to call MD.    Encouraged hand expression.   Continues to nurse well per mom. No further questions at this time.   Will follow as needed.   Veena Wallace BSN, RN, PHN, RNC-MNN, IBCLC

## 2021-07-26 NOTE — DISCHARGE SUMMARY
[facility] Discharge Summary    Teri Mendez MRN# [id number]   Age: 35 year old YOB: 1985     Date of Admission:  2021  Date of Discharge::  2021 11:33 AM  Admitting Physician:  Mark Victor MD  Discharge Physician:  Mark Victor MD               Admission Diagnoses:   Previous  section [Z98.891]  Previous  delivery, delivered [O34.219]  Cholestasis of pregnancy  AMA at 38/3 weeks          Discharge Diagnosis:   Same with delivery          Procedures:   Procedure(s): R C/S       No other procedures performed during this admission           Medications Prior to Admission:     No medications prior to admission.             Discharge Medications:     Discharge Medication List as of 2021  9:01 AM      START taking these medications    Details   acetaminophen (TYLENOL) 325 MG tablet Take 2 tablets (650 mg) by mouth every 4 hours as needed for other (multimodal surgical pain management along with NSAIDS and opioid medication as indicated based on pain control and physical function), OTC      ibuprofen (ADVIL/MOTRIN) 800 MG tablet Take 1 tablet (800 mg) by mouth every 6 hours as needed for other (cramping), Disp-60 tablet, R-1, Local Print      oxyCODONE (ROXICODONE) 5 MG tablet Take 1 tablet (5 mg) by mouth every 4 hours as needed for severe pain, Disp-12 tablet, R-0, Local Print         CONTINUE these medications which have NOT CHANGED    Details   Prenatal Vit-DSS-Fe Fum-FA (PNV FE FUM/DOCUSATE/FOLIC ACID) 29-1 MG TABS Historical         STOP taking these medications       doxylamine (UNISOM) 25 MG TABS tablet Comments:   Reason for Stopping:                     Consultations:   No consultations were requested during this admission          Brief History of Illness:   Reason for admission requiring a surgical or invasive procedure:   Previous  section [Z98.891]   The patient underwent the following procedure(s):   R C/S   There were no  immediate complications during this procedure.    Please refer to the full operative summary for details.             Hospital Course:   The patient's hospital course was unremarkable.  She recovered as anticipated and experienced no post-operative complications. She was stable for discharge home.          Discharge Instructions and Follow-Up:   Discharge diet: Regular   Discharge activity: No heavy lifting, pushing, pulling for 6 week(s)  Pelvic rest: abstain from intercourse and do not use tampons for 6 week(s)   Discharge follow-up: Follow up with Dr. Hogue in 2 weeks   Wound care: Ice to area for comfort  Keep wound clean and dry           Discharge Disposition:   Discharged to home      Attestation:  I have reviewed today's vital signs, notes, medications, labs and imaging.  Amount of time performed on this discharge summary: 15 minutes.    Mark Victor MD

## 2022-09-09 ENCOUNTER — TRANSFERRED RECORDS (OUTPATIENT)
Dept: HEALTH INFORMATION MANAGEMENT | Facility: CLINIC | Age: 37
End: 2022-09-09

## 2022-10-09 ENCOUNTER — TRANSFERRED RECORDS (OUTPATIENT)
Dept: HEALTH INFORMATION MANAGEMENT | Facility: CLINIC | Age: 37
End: 2022-10-09

## 2022-10-14 ENCOUNTER — TRANSFERRED RECORDS (OUTPATIENT)
Dept: HEALTH INFORMATION MANAGEMENT | Facility: CLINIC | Age: 37
End: 2022-10-14

## 2022-11-18 RX ORDER — MULTIPLE VITAMINS W/ MINERALS TAB 9MG-400MCG
1 TAB ORAL DAILY
COMMUNITY

## 2022-11-18 RX ORDER — MULTIVIT-MIN/IRON/FOLIC ACID/K 18-600-40
1 CAPSULE ORAL DAILY
COMMUNITY

## 2022-11-18 RX ORDER — LACTOBACILLUS RHAMNOSUS GG 10B CELL
1 CAPSULE ORAL 2 TIMES DAILY
COMMUNITY

## 2022-11-20 ENCOUNTER — ANESTHESIA EVENT (OUTPATIENT)
Dept: SURGERY | Facility: CLINIC | Age: 37
End: 2022-11-20
Payer: COMMERCIAL

## 2022-11-21 ENCOUNTER — HOSPITAL ENCOUNTER (OUTPATIENT)
Facility: CLINIC | Age: 37
Discharge: HOME | End: 2022-11-21
Attending: OBSTETRICS & GYNECOLOGY | Admitting: OBSTETRICS & GYNECOLOGY
Payer: COMMERCIAL

## 2022-11-21 ENCOUNTER — ANESTHESIA (OUTPATIENT)
Dept: SURGERY | Facility: CLINIC | Age: 37
End: 2022-11-21
Payer: COMMERCIAL

## 2022-11-21 VITALS
RESPIRATION RATE: 12 BRPM | BODY MASS INDEX: 21.94 KG/M2 | HEIGHT: 65 IN | OXYGEN SATURATION: 100 % | WEIGHT: 131.7 LBS | TEMPERATURE: 97.1 F | HEART RATE: 71 BPM | SYSTOLIC BLOOD PRESSURE: 114 MMHG | DIASTOLIC BLOOD PRESSURE: 71 MMHG

## 2022-11-21 DIAGNOSIS — Z85.41 HISTORY OF CERVICAL CANCER: Primary | ICD-10-CM

## 2022-11-21 LAB — B-HCG SERPL-ACNC: <1 IU/L (ref 0–5)

## 2022-11-21 PROCEDURE — 88309 TISSUE EXAM BY PATHOLOGIST: CPT | Mod: TC | Performed by: OBSTETRICS & GYNECOLOGY

## 2022-11-21 PROCEDURE — 84702 CHORIONIC GONADOTROPIN TEST: CPT | Performed by: OBSTETRICS & GYNECOLOGY

## 2022-11-21 PROCEDURE — 250N000011 HC RX IP 250 OP 636: Performed by: ANESTHESIOLOGY

## 2022-11-21 PROCEDURE — 258N000003 HC RX IP 258 OP 636: Performed by: ANESTHESIOLOGY

## 2022-11-21 PROCEDURE — 250N000009 HC RX 250: Performed by: NURSE ANESTHETIST, CERTIFIED REGISTERED

## 2022-11-21 PROCEDURE — 360N000080 HC SURGERY LEVEL 7, PER MIN: Performed by: OBSTETRICS & GYNECOLOGY

## 2022-11-21 PROCEDURE — 250N000013 HC RX MED GY IP 250 OP 250 PS 637: Performed by: PHYSICIAN ASSISTANT

## 2022-11-21 PROCEDURE — 710N000009 HC RECOVERY PHASE 1, LEVEL 1, PER MIN: Performed by: OBSTETRICS & GYNECOLOGY

## 2022-11-21 PROCEDURE — 999N000141 HC STATISTIC PRE-PROCEDURE NURSING ASSESSMENT: Performed by: OBSTETRICS & GYNECOLOGY

## 2022-11-21 PROCEDURE — 250N000009 HC RX 250: Performed by: ANESTHESIOLOGY

## 2022-11-21 PROCEDURE — 370N000017 HC ANESTHESIA TECHNICAL FEE, PER MIN: Performed by: OBSTETRICS & GYNECOLOGY

## 2022-11-21 PROCEDURE — 258N000001 HC RX 258: Performed by: OBSTETRICS & GYNECOLOGY

## 2022-11-21 PROCEDURE — 250N000009 HC RX 250: Performed by: OBSTETRICS & GYNECOLOGY

## 2022-11-21 PROCEDURE — 272N000001 HC OR GENERAL SUPPLY STERILE: Performed by: OBSTETRICS & GYNECOLOGY

## 2022-11-21 PROCEDURE — 250N000025 HC SEVOFLURANE, PER MIN: Performed by: OBSTETRICS & GYNECOLOGY

## 2022-11-21 PROCEDURE — 36415 COLL VENOUS BLD VENIPUNCTURE: CPT | Performed by: OBSTETRICS & GYNECOLOGY

## 2022-11-21 PROCEDURE — 250N000011 HC RX IP 250 OP 636: Performed by: NURSE ANESTHETIST, CERTIFIED REGISTERED

## 2022-11-21 PROCEDURE — 710N000012 HC RECOVERY PHASE 2, PER MINUTE: Performed by: OBSTETRICS & GYNECOLOGY

## 2022-11-21 PROCEDURE — 250N000013 HC RX MED GY IP 250 OP 250 PS 637: Performed by: OBSTETRICS & GYNECOLOGY

## 2022-11-21 PROCEDURE — 88309 TISSUE EXAM BY PATHOLOGIST: CPT | Mod: 26 | Performed by: PATHOLOGY

## 2022-11-21 PROCEDURE — 250N000011 HC RX IP 250 OP 636: Performed by: OBSTETRICS & GYNECOLOGY

## 2022-11-21 RX ORDER — SCOLOPAMINE TRANSDERMAL SYSTEM 1 MG/1
1 PATCH, EXTENDED RELEASE TRANSDERMAL ONCE
Status: DISCONTINUED | OUTPATIENT
Start: 2022-11-21 | End: 2022-11-21 | Stop reason: HOSPADM

## 2022-11-21 RX ORDER — FENTANYL CITRATE 0.05 MG/ML
25 INJECTION, SOLUTION INTRAMUSCULAR; INTRAVENOUS EVERY 5 MIN PRN
Status: DISCONTINUED | OUTPATIENT
Start: 2022-11-21 | End: 2022-11-21 | Stop reason: HOSPADM

## 2022-11-21 RX ORDER — ONDANSETRON 2 MG/ML
INJECTION INTRAMUSCULAR; INTRAVENOUS PRN
Status: DISCONTINUED | OUTPATIENT
Start: 2022-11-21 | End: 2022-11-21

## 2022-11-21 RX ORDER — MEPERIDINE HYDROCHLORIDE 25 MG/ML
12.5 INJECTION INTRAMUSCULAR; INTRAVENOUS; SUBCUTANEOUS
Status: DISCONTINUED | OUTPATIENT
Start: 2022-11-21 | End: 2022-11-21 | Stop reason: HOSPADM

## 2022-11-21 RX ORDER — LIDOCAINE HYDROCHLORIDE 20 MG/ML
INJECTION, SOLUTION INFILTRATION; PERINEURAL PRN
Status: DISCONTINUED | OUTPATIENT
Start: 2022-11-21 | End: 2022-11-21

## 2022-11-21 RX ORDER — SODIUM CHLORIDE, SODIUM LACTATE, POTASSIUM CHLORIDE, CALCIUM CHLORIDE 600; 310; 30; 20 MG/100ML; MG/100ML; MG/100ML; MG/100ML
INJECTION, SOLUTION INTRAVENOUS CONTINUOUS
Status: CANCELLED | OUTPATIENT
Start: 2022-11-21

## 2022-11-21 RX ORDER — ONDANSETRON 2 MG/ML
4 INJECTION INTRAMUSCULAR; INTRAVENOUS EVERY 30 MIN PRN
Status: CANCELLED | OUTPATIENT
Start: 2022-11-21

## 2022-11-21 RX ORDER — FENTANYL CITRATE 0.05 MG/ML
50 INJECTION, SOLUTION INTRAMUSCULAR; INTRAVENOUS EVERY 5 MIN PRN
Status: CANCELLED | OUTPATIENT
Start: 2022-11-21

## 2022-11-21 RX ORDER — LIDOCAINE 40 MG/G
CREAM TOPICAL
Status: DISCONTINUED | OUTPATIENT
Start: 2022-11-21 | End: 2022-11-21 | Stop reason: HOSPADM

## 2022-11-21 RX ORDER — OXYCODONE HYDROCHLORIDE 5 MG/1
5 TABLET ORAL
Status: COMPLETED | OUTPATIENT
Start: 2022-11-21 | End: 2022-11-21

## 2022-11-21 RX ORDER — CEFAZOLIN SODIUM/WATER 2 G/20 ML
2 SYRINGE (ML) INTRAVENOUS
Status: COMPLETED | OUTPATIENT
Start: 2022-11-21 | End: 2022-11-21

## 2022-11-21 RX ORDER — IBUPROFEN 400 MG/1
800 TABLET, FILM COATED ORAL ONCE
Status: DISCONTINUED | OUTPATIENT
Start: 2022-11-21 | End: 2022-11-21 | Stop reason: HOSPADM

## 2022-11-21 RX ORDER — BUPIVACAINE HYDROCHLORIDE AND EPINEPHRINE 5; 5 MG/ML; UG/ML
INJECTION, SOLUTION PERINEURAL PRN
Status: DISCONTINUED | OUTPATIENT
Start: 2022-11-21 | End: 2022-11-21 | Stop reason: HOSPADM

## 2022-11-21 RX ORDER — SODIUM CHLORIDE, SODIUM LACTATE, POTASSIUM CHLORIDE, CALCIUM CHLORIDE 600; 310; 30; 20 MG/100ML; MG/100ML; MG/100ML; MG/100ML
INJECTION, SOLUTION INTRAVENOUS CONTINUOUS
Status: DISCONTINUED | OUTPATIENT
Start: 2022-11-21 | End: 2022-11-21 | Stop reason: HOSPADM

## 2022-11-21 RX ORDER — ONDANSETRON 4 MG/1
4 TABLET, ORALLY DISINTEGRATING ORAL EVERY 30 MIN PRN
Status: CANCELLED | OUTPATIENT
Start: 2022-11-21

## 2022-11-21 RX ORDER — ACETAMINOPHEN 325 MG/1
975 TABLET ORAL ONCE
Status: DISCONTINUED | OUTPATIENT
Start: 2022-11-21 | End: 2022-11-21 | Stop reason: HOSPADM

## 2022-11-21 RX ORDER — PROPOFOL 10 MG/ML
INJECTION, EMULSION INTRAVENOUS PRN
Status: DISCONTINUED | OUTPATIENT
Start: 2022-11-21 | End: 2022-11-21

## 2022-11-21 RX ORDER — DEXAMETHASONE SODIUM PHOSPHATE 4 MG/ML
INJECTION, SOLUTION INTRA-ARTICULAR; INTRALESIONAL; INTRAMUSCULAR; INTRAVENOUS; SOFT TISSUE PRN
Status: DISCONTINUED | OUTPATIENT
Start: 2022-11-21 | End: 2022-11-21

## 2022-11-21 RX ORDER — MAGNESIUM HYDROXIDE 1200 MG/15ML
LIQUID ORAL PRN
Status: DISCONTINUED | OUTPATIENT
Start: 2022-11-21 | End: 2022-11-21 | Stop reason: HOSPADM

## 2022-11-21 RX ORDER — HYDROMORPHONE HCL IN WATER/PF 6 MG/30 ML
0.4 PATIENT CONTROLLED ANALGESIA SYRINGE INTRAVENOUS EVERY 5 MIN PRN
Status: CANCELLED | OUTPATIENT
Start: 2022-11-21

## 2022-11-21 RX ORDER — FENTANYL CITRATE 50 UG/ML
INJECTION, SOLUTION INTRAMUSCULAR; INTRAVENOUS PRN
Status: DISCONTINUED | OUTPATIENT
Start: 2022-11-21 | End: 2022-11-21

## 2022-11-21 RX ORDER — FENTANYL CITRATE 0.05 MG/ML
25 INJECTION, SOLUTION INTRAMUSCULAR; INTRAVENOUS
Status: DISCONTINUED | OUTPATIENT
Start: 2022-11-21 | End: 2022-11-21 | Stop reason: HOSPADM

## 2022-11-21 RX ORDER — NEOSTIGMINE METHYLSULFATE 1 MG/ML
VIAL (ML) INJECTION PRN
Status: DISCONTINUED | OUTPATIENT
Start: 2022-11-21 | End: 2022-11-21

## 2022-11-21 RX ORDER — FENTANYL CITRATE 0.05 MG/ML
25 INJECTION, SOLUTION INTRAMUSCULAR; INTRAVENOUS EVERY 5 MIN PRN
Status: CANCELLED | OUTPATIENT
Start: 2022-11-21

## 2022-11-21 RX ORDER — HYDROMORPHONE HCL IN WATER/PF 6 MG/30 ML
0.2 PATIENT CONTROLLED ANALGESIA SYRINGE INTRAVENOUS EVERY 5 MIN PRN
Status: DISCONTINUED | OUTPATIENT
Start: 2022-11-21 | End: 2022-11-21 | Stop reason: HOSPADM

## 2022-11-21 RX ORDER — FENTANYL CITRATE 0.05 MG/ML
50 INJECTION, SOLUTION INTRAMUSCULAR; INTRAVENOUS EVERY 5 MIN PRN
Status: DISCONTINUED | OUTPATIENT
Start: 2022-11-21 | End: 2022-11-21 | Stop reason: HOSPADM

## 2022-11-21 RX ORDER — HYDROMORPHONE HCL IN WATER/PF 6 MG/30 ML
0.2 PATIENT CONTROLLED ANALGESIA SYRINGE INTRAVENOUS EVERY 5 MIN PRN
Status: CANCELLED | OUTPATIENT
Start: 2022-11-21

## 2022-11-21 RX ORDER — ONDANSETRON 4 MG/1
4 TABLET, ORALLY DISINTEGRATING ORAL EVERY 30 MIN PRN
Status: DISCONTINUED | OUTPATIENT
Start: 2022-11-21 | End: 2022-11-21 | Stop reason: HOSPADM

## 2022-11-21 RX ORDER — ONDANSETRON 2 MG/ML
4 INJECTION INTRAMUSCULAR; INTRAVENOUS EVERY 30 MIN PRN
Status: DISCONTINUED | OUTPATIENT
Start: 2022-11-21 | End: 2022-11-21 | Stop reason: HOSPADM

## 2022-11-21 RX ORDER — OXYCODONE HYDROCHLORIDE 5 MG/1
5 TABLET ORAL EVERY 6 HOURS PRN
Qty: 12 TABLET | Refills: 0 | Status: SHIPPED | OUTPATIENT
Start: 2022-11-21

## 2022-11-21 RX ORDER — ACETAMINOPHEN 325 MG/1
975 TABLET ORAL ONCE
Status: COMPLETED | OUTPATIENT
Start: 2022-11-21 | End: 2022-11-21

## 2022-11-21 RX ORDER — SENNA AND DOCUSATE SODIUM 50; 8.6 MG/1; MG/1
1-2 TABLET, FILM COATED ORAL 2 TIMES DAILY
Qty: 20 TABLET | Refills: 0 | Status: SHIPPED | OUTPATIENT
Start: 2022-11-21

## 2022-11-21 RX ORDER — GLYCOPYRROLATE 0.2 MG/ML
INJECTION, SOLUTION INTRAMUSCULAR; INTRAVENOUS PRN
Status: DISCONTINUED | OUTPATIENT
Start: 2022-11-21 | End: 2022-11-21

## 2022-11-21 RX ORDER — PROPOFOL 10 MG/ML
INJECTION, EMULSION INTRAVENOUS CONTINUOUS PRN
Status: DISCONTINUED | OUTPATIENT
Start: 2022-11-21 | End: 2022-11-21

## 2022-11-21 RX ORDER — HYDROMORPHONE HCL IN WATER/PF 6 MG/30 ML
0.4 PATIENT CONTROLLED ANALGESIA SYRINGE INTRAVENOUS EVERY 5 MIN PRN
Status: DISCONTINUED | OUTPATIENT
Start: 2022-11-21 | End: 2022-11-21 | Stop reason: HOSPADM

## 2022-11-21 RX ORDER — CEFAZOLIN SODIUM/WATER 2 G/20 ML
2 SYRINGE (ML) INTRAVENOUS SEE ADMIN INSTRUCTIONS
Status: DISCONTINUED | OUTPATIENT
Start: 2022-11-21 | End: 2022-11-21 | Stop reason: HOSPADM

## 2022-11-21 RX ADMIN — SCOPALAMINE 1 PATCH: 1 PATCH, EXTENDED RELEASE TRANSDERMAL at 07:46

## 2022-11-21 RX ADMIN — FENTANYL CITRATE 50 MCG: 50 INJECTION, SOLUTION INTRAMUSCULAR; INTRAVENOUS at 10:17

## 2022-11-21 RX ADMIN — ACETAMINOPHEN 975 MG: 325 TABLET, FILM COATED ORAL at 06:54

## 2022-11-21 RX ADMIN — HYDROMORPHONE HYDROCHLORIDE 0.5 MG: 1 INJECTION, SOLUTION INTRAMUSCULAR; INTRAVENOUS; SUBCUTANEOUS at 08:36

## 2022-11-21 RX ADMIN — PROPOFOL 200 MG: 10 INJECTION, EMULSION INTRAVENOUS at 08:15

## 2022-11-21 RX ADMIN — OXYCODONE HYDROCHLORIDE 5 MG: 5 TABLET ORAL at 10:29

## 2022-11-21 RX ADMIN — SODIUM CHLORIDE, POTASSIUM CHLORIDE, SODIUM LACTATE AND CALCIUM CHLORIDE: 600; 310; 30; 20 INJECTION, SOLUTION INTRAVENOUS at 06:55

## 2022-11-21 RX ADMIN — ROCURONIUM BROMIDE 50 MG: 50 INJECTION, SOLUTION INTRAVENOUS at 08:16

## 2022-11-21 RX ADMIN — SODIUM CHLORIDE, POTASSIUM CHLORIDE, SODIUM LACTATE AND CALCIUM CHLORIDE: 600; 310; 30; 20 INJECTION, SOLUTION INTRAVENOUS at 09:40

## 2022-11-21 RX ADMIN — ROCURONIUM BROMIDE 10 MG: 50 INJECTION, SOLUTION INTRAVENOUS at 08:45

## 2022-11-21 RX ADMIN — PROPOFOL 150 MCG/KG/MIN: 10 INJECTION, EMULSION INTRAVENOUS at 08:19

## 2022-11-21 RX ADMIN — LIDOCAINE HYDROCHLORIDE 80 MG: 20 INJECTION, SOLUTION INFILTRATION; PERINEURAL at 08:15

## 2022-11-21 RX ADMIN — FENTANYL CITRATE 50 MCG: 50 INJECTION, SOLUTION INTRAMUSCULAR; INTRAVENOUS at 08:15

## 2022-11-21 RX ADMIN — PROPOFOL 50 MG: 10 INJECTION, EMULSION INTRAVENOUS at 09:26

## 2022-11-21 RX ADMIN — FENTANYL CITRATE 50 MCG: 50 INJECTION, SOLUTION INTRAMUSCULAR; INTRAVENOUS at 08:10

## 2022-11-21 RX ADMIN — ONDANSETRON 4 MG: 2 INJECTION INTRAMUSCULAR; INTRAVENOUS at 09:20

## 2022-11-21 RX ADMIN — GLYCOPYRROLATE 0.4 MG: 0.2 INJECTION, SOLUTION INTRAMUSCULAR; INTRAVENOUS at 09:22

## 2022-11-21 RX ADMIN — PROPOFOL 50 MG: 10 INJECTION, EMULSION INTRAVENOUS at 09:24

## 2022-11-21 RX ADMIN — NEOSTIGMINE METHYLSULFATE 3 MG: 1 INJECTION, SOLUTION INTRAVENOUS at 09:22

## 2022-11-21 RX ADMIN — Medication 2 G: at 08:08

## 2022-11-21 RX ADMIN — MIDAZOLAM 2 MG: 1 INJECTION INTRAMUSCULAR; INTRAVENOUS at 08:08

## 2022-11-21 RX ADMIN — DEXAMETHASONE SODIUM PHOSPHATE 4 MG: 4 INJECTION, SOLUTION INTRA-ARTICULAR; INTRALESIONAL; INTRAMUSCULAR; INTRAVENOUS; SOFT TISSUE at 08:25

## 2022-11-21 ASSESSMENT — ACTIVITIES OF DAILY LIVING (ADL)
ADLS_ACUITY_SCORE: 35

## 2022-11-21 ASSESSMENT — LIFESTYLE VARIABLES: TOBACCO_USE: 0

## 2022-11-21 NOTE — OP NOTE
Procedure Date: 2022    PREOPERATIVE DIAGNOSIS:  History of a stage IA1 cervical carcinoma.    POSTOPERATIVE DIAGNOSIS:  History of a stage IA1 cervical carcinoma.    SURGEON:  Bere Corbett M.D.     ASSISTANT:  Nikole Brody PA-C    ANESTHESIA:  General endotracheal anesthesia.    PROCEDURE:  Robotic-assisted total laparoscopic hysterectomy, bilateral salpingectomy.    INDICATIONS FOR PROCEDURE:  The patient is a 37-year-old who in 2016 was diagnosed with an invasive adenocarcinoma.  She underwent a cervical conization and endocervical curettage on 2022, and she had a stage IA1 invasive adenocarcinoma of the cervix that was moderately differentiated.  She subsequently underwent conservative management with Paps and ECCs on a regular basis and went on to have two  sections delivering two healthy children.  At this point, she elected, since she was done with childbearing, to undergo a hysterectomy as a prophylactic measure.  Pap smear prior to the hysterectomy was normal, but ECC showed atypical cells.    FINDINGS:  The patient had a normal-appearing cervix.    DESCRIPTION OF PROCEDURE:  The patient was taken to the operating room.  She received broad spectrum perioperative antibiotic prophylactically.  Pneumatic compression stockings were placed on her lower extremities.  She was brought to the operating room and placed in a supine position on a pink pad on the operating table.  General endotracheal anesthesia was administered in the usual fashion.  Once intubated, she was repositioned in low lithotomy position using well-padded Yellofin stirrups.  Her arms were padded and held at her side with draw sheets over her arms and hands.  A Hernandez was placed above her forehead.  An OG was placed by Anesthesia.  A foam donut was placed over her face.  She was prepped and draped in the usual sterile fashion.  A timeout was conducted and everyone agreed upon the procedure.  I started by  infiltrating the supraumbilical area approximately 20 cm above the symphysis pubis with 0.5% Marcaine with epinephrine.  I made an 8 mm incision with a #15 scalpel blade.  Through this incision, I inserted a Veress needle into the abdominal cavity.  Opening pressure was 1 mmHg.  The abdomen was insufflated with carbon dioxide to create a diffuse pneumoperitoneum.  Pressure limits were set and maintained at or below 15 mmHg throughout the case.  With establishment of pneumoperitoneum, the Veress needle was exchanged for an 8 mm da Kiara trocar.  The camera was then introduced through that trocar showing no upper or mid abdominal pathology.  Under direct visualization, four additional port sites were placed, an 8 mm da Kiara port was placed 7 cm to the right of the camera port in the upper abdomen, two 8 mm da Kiara ports were placed 7 cm and 14 cm to the left of the camera port in the upper abdomen.  She was then placed in 33 degrees of Trendelenburg with gravitational displacement of her bowel into the upper abdomen.  Nikole Brody placed an 8 mm AirSeal above the right anterior superior iliac spine.  The robot was docked in the usual fashion.  Monopolar scissors were placed in the right upper robotic arm.  A [place] bipolar was placed in the medial left upper robotic arm.  A ProGrasp was placed in the lateral left robotic arm.  These instruments and the camera were advanced into the pelvis.  The right round ligament was elevated with a ProGrasp and was cauterized with the [place] bipolar and divided with the monopolar scissors.  I then opened up the posterior broad ligament peritoneum, created a defect in the medial aspect of the posterior broad ligament peritoneum under the proximal adnexal structures.  Nikole then grasped the residual round ligament on the uterine side, and I grasped the fallopian tube.  I cauterized and divided the mesosalpinx all the way to the uterus and then cauterized and divided the  utero-ovarian ligament.  I then went to the left hand side and repeated this whole procedure, cauterizing and dividing the round ligament, opening up the posterior broad ligament peritoneum underneath the proximal adnexal structures, elevating the fallopian tube and cauterizing and dividing the mesosalpinx and then cauterizing and dividing the utero-ovarian ligament.  The vesicouterine peritoneum was then divided and the bladder was taken down off the anterior surface of the cervix and upper vagina.  On both sides, the soft tissues at the isthmus were cauterized with the Maryland bipolar.  Once they both had been cauterized and I felt that I had control of the uterine arteries on either side, I divided the right side.  I then worked my way down the cardinal ligament, cauterizing and dividing the soft tissues free of the cervix down to and including the uterosacral ligament.  This was then repeated on the left hand side.  Nikole Brody introduced an EEA sizer into the vagina, making sure the bladder was down anteriorly.  I made an anterior colpotomy at the cervicovaginal junction and then under direct visualization divided the cervix free of the vagina.  Nikole Brody brought in a single tooth tenaculum and grasped the cervix.  Cervix, uterus and bilateral fallopian tubes were delivered without incident.  Both ovaries had normal functional cysts and no abnormal appearance.  The vaginal cuff was closed using 0 PDS as anchoring sutures at 3 and 9 o'clock, and then I did a running full thickness anterior to posterior vaginal closure incorporating the cul-de-sac peritoneum in that closure.  The two sutures were tied together in the midline.  Nikole irrigated out the pelvis a couple of times and suctioned the saline out, as well as any residual blood.  I tacked the ovaries to the residual round ligaments by the uteroovarian ligaments using 0 Vicryl suture.  I did do a suture of the bladder superiorly where there was a  slight separation in the bladder serosa.  This was done with 3-0 Vicryl suture on an SH needle in a figure-of-eight fashion.  At that point, the robotic instruments were removed.  The robot was undocked.  The pneumoperitoneum was allowed to dissipate.  The trocars were removed intact.  The EEA sizer was removed from her vagina.  There were no vaginal lacerations noted.  The incision sites were closed with 4-0 Monocryl in an interrupted fashion to reapproximate the subcutaneous tissue and 4-0 Monocryl in a subcuticular fashion to reapproximate the skin.  Benzoin was placed around the incision.  Steri-Strips laid over the incisions.  Her anesthesia was reversed.  She was extubated and taken to recovery room in stable condition.    ESTIMATED BLOOD LOSS FOR PROCEDURE:  50 mL    Nikole Brody was my primary assist for the case.  She helped me with all aspects of the case, including trocar placement, docking of the robot, placement of the robotic instruments and their exchange during the case.  She assisted through the accessory port site, providing necessary countertraction, optimizing visualization and suctioning and irrigating when necessary.  She was instrumental in specimen retrieval.  She helped with cuff closure and eventual undocking the robot and port site closure.    Bere Corbett MD        D: 2022   T: 2022   MT: NORA    Name:     RIDDHI MIRZA  MRN:      [id number]        Account:        [id number]   :      1985           Procedure Date: 2022     Document: [id number]    cc:  Melly Reardon DO

## 2022-11-21 NOTE — ANESTHESIA PREPROCEDURE EVALUATION
Anesthesia Pre-Procedure Evaluation    Patient: Teri Mendez   MRN: [id number] : 1985        Procedure : Procedure(s):  ROBOTIC ASSISTED TOTAL LAPAROSCOPIC HYSTERECTOMY, BILATERAL SALPINGECTOMY  POSSIBLE RIGHT OVARIAN CYSTECTOMY          Past Medical History:   Diagnosis Date     Cervical cancer (H) 2016     PONV (postoperative nausea and vomiting)       Past Surgical History:   Procedure Laterality Date     APPENDECTOMY        SECTION N/A 2017    Procedure:  SECTION;;  Surgeon: Jazmin Hogue MD;  Location:  [facility]      SECTION N/A 2021    Procedure: REPEAT  SECTION;  Surgeon: Jazmin Hogue MD;  Location:  [facility]     DILATION AND CURETTAGE SUCTION N/A 2016    Procedure: DILATION AND CURETTAGE SUCTION;  Surgeon: Marilou Kimble MD;  Location: [facility]     ORTHOPEDIC SURGERY      knee      No Known Allergies   Social History     Tobacco Use     Smoking status: Never     Smokeless tobacco: Never   Substance Use Topics     Alcohol use: Yes      Wt Readings from Last 1 Encounters:   22 59.7 kg (131 lb 11.2 oz)        Anesthesia Evaluation   Pt has had prior anesthetic.     History of anesthetic complications  - PONV.      ROS/MED HX  ENT/Pulmonary:    (-) tobacco use, asthma and sleep apnea   Neurologic:       Cardiovascular:  - neg cardiovascular ROS     METS/Exercise Tolerance:     Hematologic:       Musculoskeletal:       GI/Hepatic:    (-) GERD   Renal/Genitourinary:       Endo:    (-) Type II DM and thyroid disease   Psychiatric/Substance Use:       Infectious Disease:       Malignancy:       Other:            Physical Exam    Airway        Mallampati: I   TM distance: > 3 FB   Neck ROM: full   Mouth opening: > 3 cm    Respiratory Devices and Support         Dental  no notable dental history         Cardiovascular   cardiovascular exam normal          Pulmonary   pulmonary exam normal                OUTSIDE LABS:  CBC:   Lab  Results   Component Value Date    WBC 9.2 09/20/2017    HGB 11.4 (L) 07/14/2021    HGB 11.8 07/13/2021    HCT 33.4 (L) 09/20/2017     09/20/2017     BMP: No results found for: NA, POTASSIUM, CHLORIDE, CO2, BUN, CR, GLC  COAGS:   Lab Results   Component Value Date    PTT 25 09/20/2017    INR 0.87 09/20/2017     POC: No results found for: BGM, HCG, HCGS  HEPATIC:   Lab Results   Component Value Date    ALBUMIN 2.0 (L) 09/20/2017    PROTTOTAL 6.1 (L) 09/20/2017     (H) 09/20/2017    AST 91 (H) 09/20/2017    ALKPHOS 261 (H) 09/20/2017    BILITOTAL 0.3 09/20/2017     OTHER: No results found for: PH, LACT, A1C, VILMA, PHOS, MAG, LIPASE, AMYLASE, TSH, T4, T3, CRP, SED    Anesthesia Plan    ASA Status:  2      Anesthesia Type: General.     - Airway: ETT   Induction: Intravenous.   Maintenance: TIVA.        Consents    Anesthesia Plan(s) and associated risks, benefits, and realistic alternatives discussed. Questions answered and patient/representative(s) expressed understanding.    - Discussed:     - Discussed with:  Patient         Postoperative Care    Pain management: IV analgesics, Oral pain medications.   PONV prophylaxis: Ondansetron (or other 5HT-3), Dexamethasone or Solumedrol, Background Propofol Infusion, Scopolamine patch     Comments:                Kim Guzmán

## 2022-11-21 NOTE — ANESTHESIA CARE TRANSFER NOTE
Patient: Teri Mendze    Procedure: Procedure(s):  ROBOTIC ASSISTED TOTAL LAPAROSCOPIC HYSTERECTOMY, BILATERAL SALPINGECTOMY       Diagnosis: Malignant neoplasm of endocervix (H) [C53.0]  Diagnosis Additional Information: No value filed.    Anesthesia Type:   General     Note:    Oropharynx: oropharynx clear of all foreign objects and spontaneously breathing  Level of Consciousness: drowsy  Oxygen Supplementation: face mask  Level of Supplemental Oxygen (L/min / FiO2): 8  Independent Airway: airway patency satisfactory and stable  Dentition: dentition unchanged  Vital Signs Stable: post-procedure vital signs reviewed and stable  Report to RN Given: handoff report given  Patient transferred to: PACU    Handoff Report: Identifed the Patient, Identified the Reponsible Provider, Reviewed the pertinent medical history, Discussed the surgical course, Reviewed Intra-OP anesthesia mangement and issues during anesthesia, Set expectations for post-procedure period and Allowed opportunity for questions and acknowledgement of understanding      Vitals:  Vitals Value Taken Time   /67 11/21/22 0943   Temp     Pulse 78 11/21/22 0945   Resp 9 11/21/22 0945   SpO2 100 % 11/21/22 0945   Vitals shown include unvalidated device data.    Electronically Signed By: GAYE Handley CRNA  November 21, 2022  9:47 AM

## 2022-11-21 NOTE — DISCHARGE INSTRUCTIONS
Today you were given 975 mg of Tylenol at 630. The recommended daily maximum dose is 4000 mg.      Same Day Surgery Discharge Instructions for  Sedation and General Anesthesia     It's not unusual to feel dizzy, light-headed or faint for up to 24 hours after surgery or while taking pain medication.  If you have these symptoms: sit for a few minutes before standing and have someone assist you when you get up to walk or use the bathroom.    You should rest and relax for the next 24 hours. We recommend you make arrangements to have an adult stay with you for at least 24 hours after your discharge.  Avoid hazardous and strenuous activity.    DO NOT DRIVE any vehicle or operate mechanical equipment for 24 hours following the end of your surgery.  Even though you may feel normal, your reactions may be affected by the medication you have received.    Do not drink alcoholic beverages for 24 hours following surgery.     Slowly progress to your regular diet as you feel able. It's not unusual to feel nauseated and/or vomit after receiving anesthesia.  If you develop these symptoms, drink clear liquids (apple juice, ginger ale, broth, 7-up, etc. ) until you feel better.  If your nausea and vomiting persists for 24 hours, please notify your surgeon.      All narcotic pain medications, along with inactivity and anesthesia, can cause constipation. Drinking plenty of liquids and increasing fiber intake will help.    For any questions of a medical nature, call your surgeon.    Do not make important decisions for 24 hours.    If you had general anesthesia, you may have a sore throat for a couple of days related to the breathing tube used during surgery.  You may use Cepacol lozenges to help with this discomfort.  If it worsens or if you develop a fever, contact your surgeon.     If you feel your pain is not well managed with the pain medications prescribed by your surgeon, please contact your surgeon's office to let them know so they  can address your concerns.      **If you have questions or concerns about your procedure,   call Dr. Corbett [phone]**

## 2022-11-21 NOTE — OR NURSING
Patient ready for discharge.  No order to stay until she voids but patient prefers to wait until she voids before going home.

## 2022-11-21 NOTE — BRIEF OP NOTE
[facility]    Brief Operative Note    Pre-operative diagnosis: Malignant neoplasm of endocervix (H) [C53.0]  Post-operative diagnosis Same as pre-operative diagnosis    Procedure: Procedure(s):  ROBOTIC ASSISTED TOTAL LAPAROSCOPIC HYSTERECTOMY, BILATERAL SALPINGECTOMY  Surgeon: Surgeon(s) and Role:     * Bere Corbett MD - Primary  Anesthesia: General   Estimated Blood Loss: 50mL    Drains: None  Specimens:   ID Type Source Tests Collected by Time Destination   1 :  Tissue Uterus, Cervix, Bilateral Fallopian Tubes SURGICAL PATHOLOGY EXAM Bere Corbett MD 11/21/2022  8:47 AM      Findings:   normal functional cysts on bilateral ovaries. Normal appearing uterus..  Complications: None.  Implants: * No implants in log *

## 2022-11-21 NOTE — OR NURSING
Patient brought a picture of home covid antigen test on phone as directed.  I personally saw this result and it was time stamped 11/20/2022.  Result was negative.

## 2022-11-21 NOTE — ANESTHESIA POSTPROCEDURE EVALUATION
Patient: Teri Mendez    Procedure: Procedure(s):  ROBOTIC ASSISTED TOTAL LAPAROSCOPIC HYSTERECTOMY, BILATERAL SALPINGECTOMY       Anesthesia Type:  General    Note:  Disposition: Inpatient   Postop Pain Control: Uneventful            Sign Out: Well controlled pain   PONV: No   Neuro/Psych: Uneventful            Sign Out: Acceptable/Baseline neuro status   Airway/Respiratory: Uneventful            Sign Out: Acceptable/Baseline resp. status   CV/Hemodynamics: Uneventful            Sign Out: Acceptable CV status; No obvious hypovolemia; No obvious fluid overload   Other NRE: NONE   DID A NON-ROUTINE EVENT OCCUR?            Last vitals:  Vitals Value Taken Time   /64 11/21/22 1045   Temp 36.2  C (97.1  F) 11/21/22 1045   Pulse 102 11/21/22 1049   Resp 14 11/21/22 1050   SpO2 100 % 11/21/22 1051   Vitals shown include unvalidated device data.    Electronically Signed By: Kim Guzmán  November 21, 2022  11:10 AM

## 2022-11-21 NOTE — ANESTHESIA PROCEDURE NOTES
Airway       Patient location during procedure: OR       Procedure Start/Stop Times: 11/21/2022 8:19 AM  Staff -        Anesthesiologist:  Kim Guzmán       CRNA: Alivia Inman APRN CRNA       Performed By: CRNA  Consent for Airway        Urgency: elective  Indications and Patient Condition       Indications for airway management: jaki-procedural       Induction type:intravenous       Mask difficulty assessment: 2 - vent by mask + OA or adjuvant +/- NMBA    Final Airway Details       Final airway type: endotracheal airway       Successful airway: ETT - single  Endotracheal Airway Details        ETT size (mm): 7.0       Cuffed: yes       Successful intubation technique: direct laryngoscopy       DL Blade Type: [place] 2       Grade View of Cords: 1       Adjucts: stylet       Position: Right       Measured from: lips       Secured at (cm): 21       Bite block used: None    Post intubation assessment        Placement verified by: capnometry, equal breath sounds and chest rise        Number of attempts at approach: 1       Secured with: pink tape       Ease of procedure: easy       Dentition: Intact and Unchanged    Medication(s) Administered   Medication Administration Time: 11/21/2022 8:19 AM

## 2022-11-22 LAB
PATH REPORT.COMMENTS IMP SPEC: NORMAL
PATH REPORT.COMMENTS IMP SPEC: NORMAL
PATH REPORT.FINAL DX SPEC: NORMAL
PATH REPORT.GROSS SPEC: NORMAL
PATH REPORT.MICROSCOPIC SPEC OTHER STN: NORMAL
PATH REPORT.RELEVANT HX SPEC: NORMAL
PHOTO IMAGE: NORMAL

## 2025-05-05 ENCOUNTER — LAB REQUISITION (OUTPATIENT)
Dept: LAB | Facility: CLINIC | Age: 40
End: 2025-05-05

## 2025-05-05 DIAGNOSIS — R10.84 GENERALIZED ABDOMINAL PAIN: ICD-10-CM

## 2025-05-05 LAB
ERYTHROCYTE [DISTWIDTH] IN BLOOD BY AUTOMATED COUNT: 11.8 % (ref 10–15)
HCT VFR BLD AUTO: 41.4 % (ref 35–47)
HGB BLD-MCNC: 13.4 G/DL (ref 11.7–15.7)
MCH RBC QN AUTO: 30.8 PG (ref 26.5–33)
MCHC RBC AUTO-ENTMCNC: 32.4 G/DL (ref 31.5–36.5)
MCV RBC AUTO: 95 FL (ref 78–100)
PLATELET # BLD AUTO: 341 10E3/UL (ref 150–450)
RBC # BLD AUTO: 4.35 10E6/UL (ref 3.8–5.2)
WBC # BLD AUTO: 11.6 10E3/UL (ref 4–11)

## 2025-05-05 PROCEDURE — 85027 COMPLETE CBC AUTOMATED: CPT | Performed by: FAMILY MEDICINE

## 2025-05-05 PROCEDURE — 80053 COMPREHEN METABOLIC PANEL: CPT | Performed by: FAMILY MEDICINE

## 2025-05-05 PROCEDURE — 83690 ASSAY OF LIPASE: CPT | Performed by: FAMILY MEDICINE

## 2025-05-06 LAB
ALBUMIN SERPL BCG-MCNC: 4.8 G/DL (ref 3.5–5.2)
ALP SERPL-CCNC: 69 U/L (ref 40–150)
ALT SERPL W P-5'-P-CCNC: 22 U/L (ref 0–50)
ANION GAP SERPL CALCULATED.3IONS-SCNC: 12 MMOL/L (ref 7–15)
AST SERPL W P-5'-P-CCNC: 24 U/L (ref 0–45)
BILIRUB SERPL-MCNC: 0.4 MG/DL
BUN SERPL-MCNC: 11.2 MG/DL (ref 6–20)
CALCIUM SERPL-MCNC: 9.9 MG/DL (ref 8.8–10.4)
CHLORIDE SERPL-SCNC: 101 MMOL/L (ref 98–107)
CREAT SERPL-MCNC: 0.55 MG/DL (ref 0.51–0.95)
EGFRCR SERPLBLD CKD-EPI 2021: >90 ML/MIN/1.73M2
GLUCOSE SERPL-MCNC: 114 MG/DL (ref 70–99)
HCO3 SERPL-SCNC: 25 MMOL/L (ref 22–29)
LIPASE SERPL-CCNC: 29 U/L (ref 13–60)
POTASSIUM SERPL-SCNC: 3.9 MMOL/L (ref 3.4–5.3)
PROT SERPL-MCNC: 7.3 G/DL (ref 6.4–8.3)
SODIUM SERPL-SCNC: 138 MMOL/L (ref 135–145)

## (undated) DEVICE — RX SURGIFLO HEMOSTATIC MATRIX W/THROMBIN 8ML 2994

## (undated) DEVICE — SOL NACL 0.9% IRRIG 1000ML BOTTLE 07138-09

## (undated) DEVICE — DAVINCI XI SEAL UNIVERSAL 5-8MM 470361

## (undated) DEVICE — SU VICRYL 2-0 CT-2 27" J333H

## (undated) DEVICE — DAVINCI XI DRAPE ARM 470015

## (undated) DEVICE — TUBING IV EXTENSION SET 34"

## (undated) DEVICE — SUCTION IRR STRYKERFLOW II W/TIP 250-070-520

## (undated) DEVICE — SOL NACL 0.9% IRRIG 1000ML BOTTLE 2F7124

## (undated) DEVICE — PACK DAVINCI GYN SMA15GDFS1

## (undated) DEVICE — SPONGE LAP 18X18" X8435

## (undated) DEVICE — SOL WATER IRRIG 1000ML BOTTLE 2F7114

## (undated) DEVICE — BLADE CLIPPER 4406

## (undated) DEVICE — ENDO TROCAR CONMED AIRSEAL BLADELESS 08X120MM IAS8-120LP

## (undated) DEVICE — SUCTION CANISTER MEDIVAC LINER 3000ML W/LID 65651-530

## (undated) DEVICE — LIGHT HANDLE X2

## (undated) DEVICE — ESU GROUND PAD UNIVERSAL W/O CORD

## (undated) DEVICE — PREP CHLORAPREP 26ML TINTED ORANGE  260815

## (undated) DEVICE — PACK SET-UP STD 9102

## (undated) DEVICE — DAVINCI XI OBTURATOR BLADELESS 8MM 470359

## (undated) DEVICE — SYR 10ML FINGER CONTROL W/O NDL 309695

## (undated) DEVICE — SOL ADH LIQUID BENZOIN SWAB 0.6ML C1544

## (undated) DEVICE — GLOVE BIOGEL PI ULTRATOUCH G SZ 6.5 42165

## (undated) DEVICE — PAD CHUX UNDERPAD 23X24" 7136

## (undated) DEVICE — DRSG KERLIX FLUFFS X5

## (undated) DEVICE — PACK C-SECTION LF PL15OTA83B

## (undated) DEVICE — SU MONOCRYL 4-0 PS-2 18" UND Y496G

## (undated) DEVICE — DRAPE SHEET REV FOLD 3/4 9349

## (undated) DEVICE — TUBING CONMED AIRSEAL SMOKE EVAC INSUFFLATION ASM-EVAC

## (undated) DEVICE — KIT PATIENT POSITIONING PIGAZZI LATEX FREE 40580

## (undated) DEVICE — LINEN TOWEL PACK X5 5464

## (undated) DEVICE — NDL INSUFFLATION 13GA 120MM C2201

## (undated) DEVICE — SYSTEM LAPAROVUE VISIBILITY LAPVUE10

## (undated) DEVICE — SU VICRYL 3-0 SH 27" J316H

## (undated) DEVICE — LINEN BABY BLANKET 5434

## (undated) DEVICE — DRAPE CV SPLIT II 147X106" 9158

## (undated) DEVICE — SOL NACL 0.9% INJ 1000ML BAG 2B1324X

## (undated) DEVICE — STPL SKIN 35W APPOSE 8886803712

## (undated) DEVICE — DAVINCI HOT SHEARS TIP COVER  400180

## (undated) DEVICE — SOL WATER IRRIG 1000ML BOTTLE 07139-09

## (undated) DEVICE — STPL SKIN PROXIMATE 35 WIDE PMW35

## (undated) DEVICE — CATH INTERMITTENT CLEAN-CATH FEMALE 14FR 6" VINYL LF 420614

## (undated) DEVICE — SPONGE RAY-TEC 4X4" 7317

## (undated) DEVICE — DRSG STERI STRIP 1X5" R1548

## (undated) DEVICE — DAVINCI XI DRAPE COLUMN 470341

## (undated) DEVICE — SU PDS II 0 CT-2 27" Z334H

## (undated) RX ORDER — LIDOCAINE HYDROCHLORIDE 20 MG/ML
INJECTION, SOLUTION EPIDURAL; INFILTRATION; INTRACAUDAL; PERINEURAL
Status: DISPENSED
Start: 2022-11-21

## (undated) RX ORDER — SCOLOPAMINE TRANSDERMAL SYSTEM 1 MG/1
PATCH, EXTENDED RELEASE TRANSDERMAL
Status: DISPENSED
Start: 2022-11-21

## (undated) RX ORDER — OXYCODONE HYDROCHLORIDE 5 MG/1
TABLET ORAL
Status: DISPENSED
Start: 2022-11-21

## (undated) RX ORDER — DEXAMETHASONE SODIUM PHOSPHATE 4 MG/ML
INJECTION, SOLUTION INTRA-ARTICULAR; INTRALESIONAL; INTRAMUSCULAR; INTRAVENOUS; SOFT TISSUE
Status: DISPENSED
Start: 2022-11-21

## (undated) RX ORDER — ONDANSETRON 2 MG/ML
INJECTION INTRAMUSCULAR; INTRAVENOUS
Status: DISPENSED
Start: 2022-11-21

## (undated) RX ORDER — MORPHINE SULFATE 1 MG/ML
INJECTION, SOLUTION EPIDURAL; INTRATHECAL; INTRAVENOUS
Status: DISPENSED
Start: 2021-07-13

## (undated) RX ORDER — BUPIVACAINE HYDROCHLORIDE AND EPINEPHRINE 5; 5 MG/ML; UG/ML
INJECTION, SOLUTION EPIDURAL; INTRACAUDAL; PERINEURAL
Status: DISPENSED
Start: 2022-11-21

## (undated) RX ORDER — FENTANYL CITRATE 50 UG/ML
INJECTION, SOLUTION INTRAMUSCULAR; INTRAVENOUS
Status: DISPENSED
Start: 2021-07-13

## (undated) RX ORDER — OXYTOCIN/0.9 % SODIUM CHLORIDE 30/500 ML
PLASTIC BAG, INJECTION (ML) INTRAVENOUS
Status: DISPENSED
Start: 2021-07-13

## (undated) RX ORDER — PROPOFOL 10 MG/ML
INJECTION, EMULSION INTRAVENOUS
Status: DISPENSED
Start: 2022-11-21

## (undated) RX ORDER — CEFAZOLIN SODIUM/WATER 2 G/20 ML
SYRINGE (ML) INTRAVENOUS
Status: DISPENSED
Start: 2022-11-21

## (undated) RX ORDER — KETOROLAC TROMETHAMINE 30 MG/ML
INJECTION, SOLUTION INTRAMUSCULAR; INTRAVENOUS
Status: DISPENSED
Start: 2021-07-13

## (undated) RX ORDER — HYDROMORPHONE HYDROCHLORIDE 1 MG/ML
INJECTION, SOLUTION INTRAMUSCULAR; INTRAVENOUS; SUBCUTANEOUS
Status: DISPENSED
Start: 2022-11-21

## (undated) RX ORDER — ONDANSETRON 2 MG/ML
INJECTION INTRAMUSCULAR; INTRAVENOUS
Status: DISPENSED
Start: 2021-07-13

## (undated) RX ORDER — FENTANYL CITRATE 0.05 MG/ML
INJECTION, SOLUTION INTRAMUSCULAR; INTRAVENOUS
Status: DISPENSED
Start: 2022-11-21

## (undated) RX ORDER — FENTANYL CITRATE 50 UG/ML
INJECTION, SOLUTION INTRAMUSCULAR; INTRAVENOUS
Status: DISPENSED
Start: 2022-11-21

## (undated) RX ORDER — ACETAMINOPHEN 325 MG/1
TABLET ORAL
Status: DISPENSED
Start: 2022-11-21